# Patient Record
Sex: FEMALE | Race: OTHER
[De-identification: names, ages, dates, MRNs, and addresses within clinical notes are randomized per-mention and may not be internally consistent; named-entity substitution may affect disease eponyms.]

---

## 2017-08-08 ENCOUNTER — HOSPITAL ENCOUNTER (OUTPATIENT)
Dept: HOSPITAL 74 - JER | Age: 42
Setting detail: OBSERVATION
LOS: 1 days | Discharge: HOME | End: 2017-08-09
Attending: NURSE PRACTITIONER | Admitting: INTERNAL MEDICINE
Payer: COMMERCIAL

## 2017-08-08 VITALS — BODY MASS INDEX: 37.2 KG/M2

## 2017-08-08 DIAGNOSIS — F17.210: ICD-10-CM

## 2017-08-08 DIAGNOSIS — Z91.011: ICD-10-CM

## 2017-08-08 DIAGNOSIS — K61.1: Primary | ICD-10-CM

## 2017-08-08 DIAGNOSIS — F41.9: ICD-10-CM

## 2017-08-08 DIAGNOSIS — F32.9: ICD-10-CM

## 2017-08-08 DIAGNOSIS — Z91.040: ICD-10-CM

## 2017-08-08 LAB
ALBUMIN SERPL-MCNC: 3.9 G/DL (ref 3.4–5)
ALP SERPL-CCNC: 75 U/L (ref 45–117)
ALT SERPL-CCNC: 18 U/L (ref 12–78)
ANION GAP SERPL CALC-SCNC: 7 MMOL/L (ref 8–16)
APPEARANCE UR: (no result)
APTT BLD: 29.7 SECONDS (ref 26.9–34.4)
AST SERPL-CCNC: 9 U/L (ref 15–37)
BASOPHILS # BLD: 0.3 % (ref 0–2)
BILIRUB SERPL-MCNC: 0.5 MG/DL (ref 0.2–1)
BILIRUB UR STRIP.AUTO-MCNC: NEGATIVE MG/DL
CALCIUM SERPL-MCNC: 9 MG/DL (ref 8.5–10.1)
CK SERPL-CCNC: 73 IU/L (ref 26–192)
CO2 SERPL-SCNC: 24 MMOL/L (ref 21–32)
COLOR UR: YELLOW
CREAT SERPL-MCNC: 0.8 MG/DL (ref 0.55–1.02)
DEPRECATED RDW RBC AUTO: 15 % (ref 11.6–15.6)
EOSINOPHIL # BLD: 0.1 % (ref 0–4.5)
GLUCOSE SERPL-MCNC: 98 MG/DL (ref 74–106)
INR BLD: 1.2 (ref 0.82–1.09)
KETONES UR QL STRIP: NEGATIVE
LEUKOCYTE ESTERASE UR QL STRIP.AUTO: (no result)
MCH RBC QN AUTO: 24.1 PG (ref 25.7–33.7)
MCHC RBC AUTO-ENTMCNC: 32.4 G/DL (ref 32–36)
MCV RBC: 74.5 FL (ref 80–96)
MUCOUS THREADS URNS QL MICRO: (no result)
NEUTROPHILS # BLD: 67.6 % (ref 42.8–82.8)
NITRITE UR QL STRIP: NEGATIVE
PH BLDV: 7.43 [PH] (ref 7.32–7.42)
PH UR: 6 [PH] (ref 5–8)
PLATELET # BLD AUTO: 221 K/MM3 (ref 134–434)
PMV BLD: 8.7 FL (ref 7.5–11.1)
PROT SERPL-MCNC: 7.4 G/DL (ref 6.4–8.2)
PROT UR QL STRIP: NEGATIVE
PROT UR QL STRIP: NEGATIVE
PT PNL PPP: 13.3 SEC (ref 9.98–11.88)
RBC # BLD AUTO: 13 /HPF (ref 0–3)
RBC # UR STRIP: (no result) /UL
SAO2 % BLDV: 23.3 MEQ/L (ref 19–25)
TROPONIN I SERPL-MCNC: < 0.02 NG/ML (ref 0–0.05)
UROBILINOGEN UR STRIP-MCNC: NEGATIVE MG/DL (ref 0.2–1)
WBC # BLD AUTO: 11.1 K/MM3 (ref 4–10)
WBC # UR AUTO: 9 /HPF (ref 3–5)

## 2017-08-08 PROCEDURE — G0378 HOSPITAL OBSERVATION PER HR: HCPCS

## 2017-08-08 PROCEDURE — 3E0337Z INTRODUCTION OF ELECTROLYTIC AND WATER BALANCE SUBSTANCE INTO PERIPHERAL VEIN, PERCUTANEOUS APPROACH: ICD-10-PCS | Performed by: NURSE PRACTITIONER

## 2017-08-08 PROCEDURE — 3E033NZ INTRODUCTION OF ANALGESICS, HYPNOTICS, SEDATIVES INTO PERIPHERAL VEIN, PERCUTANEOUS APPROACH: ICD-10-PCS | Performed by: NURSE PRACTITIONER

## 2017-08-08 PROCEDURE — 3E03329 INTRODUCTION OF OTHER ANTI-INFECTIVE INTO PERIPHERAL VEIN, PERCUTANEOUS APPROACH: ICD-10-PCS | Performed by: NURSE PRACTITIONER

## 2017-08-08 NOTE — PDOC
History of Present Illness





- General


Chief Complaint: Bleeding from Anus


Stated Complaint: Rectal Bleed


Time Seen by Provider: 08/08/17 17:01


History Source: Patient


Exam Limitations: No Limitations





- History of Present Illness


Initial Comments: 





08/08/17 17:37


41 y/o female draining pus and blood from the rectum since yesterday. Patient 

states went to see Dr. Reddy for the above rerouted patient to the ER for 

concerning diagnosis of perirectal abscess. Patient states has had no fever but 

has had subjective chills. Patient denies history of immunosuppression 

including HIV and diabetes. Patient states has had similar symptoms 

approximately 2 years ago which resolved after having an I&D performed by Dr. Reddy.


Timing/Duration: getting worse


Severity: moderate


Associated Symptoms: reports: fever/chills





Past History





- Travel


Traveled outside of the country in the last 30 days: No


Close contact w/someone who was outside of country & ill: No





- Past Medical History


Allergies/Adverse Reactions: 


 Allergies











Allergy/AdvReac Type Severity Reaction Status Date / Time


 


latex Allergy   Verified 08/08/17 17:31


 


lactose AdvReac   Verified 08/08/17 17:32











Home Medications: 


Ambulatory Orders





Cyclobenzaprine HCl [Flexeril -] 10 mg PO DAILY 08/08/17 


Escitalopram Oxalate [Lexapro -] 10 mg PO DAILY 08/08/17 


Gabapentin [Neurontin] 100 mg PO TID 08/08/17 


Lamotrigine [Lamictal -] 25 mg PO DAILY 08/08/17 


Meloxicam [Mobic] 10 mg PO DAILY 08/08/17 


Amox-Tr/K Cl [Augmentin 875-125mg Tablet -] 1 tab PO BID@1000,2200 #14 tablet 08 /09/17 


Ibuprofen [Motrin -] 600 mg PO TID PRN #90 tablet 08/09/17 











- Psycho/Social/Smoking Cessation Hx


Suicidal Ideation: No


Smoking History: Current every day smoker


Number of Cigarettes Smoked Daily: 6


Information on smoking cessation initiated: No


Hx Alcohol Use: No


Drug/Substance Use Hx: No


Patient Lives Alone: No


Lives with/in: spouse/SO





**Review of Systems





- Review of Systems


Able to Perform ROS?: Yes


Constitutional: Yes: Chills


ABD/GI: No: Symptoms Reported


Musculoskeletal: No: Symptoms Reported


Integumentary: Yes: Erythema, Lumps (rectal)


Neurological: No: Symptoms reported


Endocrine: No: Symptoms Reported


Hematologic/Lymphatic: No: Symptoms Reported





*Physical Exam





- Vital Signs


 Last Vital Signs











Temp Pulse Resp BP Pulse Ox


 


 98.0 F   100 H  18   122/65   98 


 


 08/08/17 16:39  08/08/17 16:39  08/08/17 16:39  08/08/17 16:39  08/08/17 16:39














- Physical Exam


General Appearance: Yes: Nourished, Appropriately Dressed.  No: Apparent 

Distress


Rectal Exam: positive: other (noted erythematous edematous rectum draining blood

-tinged purulent fluid from anus. )


Integumentary: positive: Warm, Moist





ED Treatment Course





- LABORATORY


CBC & Chemistry Diagram: 


 08/09/17 07:30





 08/09/17 07:30





- RADIOLOGY


Radiology Studies Ordered: 














 Category Date Time Status


 


 ABDOMEN & PELVIS CT WITH CONTR [CT] Stat CT Scan  08/08/17 17:14 Ordered


 


 CHEST X-RAY PORTABLE* [RAD] Stat Radiology  08/08/17 17:12 Ordered














Medical Decision Making





- Medical Decision Making


08/08/17 17:28


Patient sent here from Dr. Reddy for evaluation of possible perirectal 

abscess. He is recommending medical evaluation including a CT. Patient has been 

ordered for septic workup including abdominal pelvic CT including perirectal 

contrast and IV contrast.





08/08/17 18:57


 Laboratory Tests











  08/08/17 08/08/17 08/08/17





  17:25 17:30 17:30


 


WBC   11.1 H 


 


Hgb   12.2 


 


Hct   37.7 


 


MCV   74.5 L 


 


MCH   24.1 L 


 


INR    1.20 H


 


Sodium   


 


Potassium   


 


Chloride   


 


Carbon Dioxide   


 


Anion Gap   


 


BUN   


 


Creatinine   


 


Random Glucose   


 


Lactic Acid  1.7  


 


Creatine Kinase   


 


Troponin I   


 


Urine HCG, Qual   














  08/08/17 08/08/17





  17:30 18:35


 


WBC  


 


Hgb  


 


Hct  


 


MCV  


 


MCH  


 


INR  


 


Sodium  137 


 


Potassium  3.9 


 


Chloride  106 


 


Carbon Dioxide  24 


 


Anion Gap  7 L 


 


BUN  18 


 


Creatinine  0.8 


 


Random Glucose  98 


 


Lactic Acid  


 


Creatine Kinase  73 


 


Troponin I  < 0.02 


 


Urine HCG, Qual   Pending














*DC/Admit/Observation/Transfer


Diagnosis at time of Disposition: 


 Perirectal abscess





- Discharge Dispostion


Disposition: HOME


Condition at time of disposition: Improved





- Prescriptions

## 2017-08-08 NOTE — HP
CHIEF COMPLAINT: Rectal Pain, Swelling, Discharge





PCP: Dr. Marya Connelly





HISTORY OF PRESENT ILLNESS:


This is a 41 y/o woman with a past medical history of Perirectal Abscess. Who 

presents to the ED from Dr. Reddy's office for rectal pain and swelling x 1 

week. Patient reports noting green pus mixed with serous discharge from her 

rectum today. Patient reports having chills with no subjective fever. Patient 

denies cough, SOB, CP, AP, N/V/D, constipation, dysuria. 





ER course was notable for:


(1) WBC 11.1


(2) Lactic Acid 1.7


(3) CT- negative perirectal abscess





Recent Travel: None





PAST MEDICAL HISTORY:


Perirectal Abscess


Depression


Anxiety


Herniated Discs (cervical, lumbar)





PAST SURGICAL HISTORY:


Hiatal Hernia Repair


R- ankle (hector/screws)


C- section 


TOP x2





Social History:


Smoking: Cigarettes 7/day


Alcohol:  Occasional


Drugs:    Marijuana use


Lives with S.O.








Family History:


Mother: HTN, AIDS, Hep C, Heroin Abuse


Father: HTN, CAD, HIV, Heroin Abuse


Brother: HTN





Allergies





latex Allergy (Verified 08/08/17 17:31)


 


lactose Adverse Reaction (Verified 08/08/17 17:32)


 








HOME MEDICATIONS:


 Home Medications











 Medication  Instructions  Recorded


 


Cyclobenzaprine HCl [Flexeril -] 10 mg PO DAILY 08/08/17


 


Escitalopram Oxalate [Lexapro -] 10 mg PO DAILY 08/08/17


 


Gabapentin [Neurontin] 100 mg PO TID 08/08/17


 


Lamotrigine [Lamictal -] 25 mg PO DAILY 08/08/17


 


Meloxicam [Mobic] 10 mg PO DAILY 08/08/17








REVIEW OF SYSTEMS


CONSTITUTIONAL: chills


Absent:  fever, diaphoresis, generalized weakness, malaise, loss of appetite, 

weight change


HEENT: 


Absent:  rhinorrhea, nasal congestion, throat pain, throat swelling, difficulty 

swallowing, mouth swelling, ear pain, eye pain, visual changes


CARDIOVASCULAR: 


Absent: chest pain, syncope, palpitations, irregular heart rate, lightheadedness

, peripheral edema


RESPIRATORY: 


Absent: cough, shortness of breath, dyspnea with exertion, orthopnea, wheezing, 

stridor, hemoptysis


GASTROINTESTINAL:


Absent: abdominal pain, abdominal distension, nausea, vomiting, diarrhea, 

constipation, melena, hematochezia


RECTUM: pain, swelling, erythema, pus


GENITOURINARY: 


Absent: dysuria, frequency, urgency, hesitancy, hematuria, flank pain, genital 

pain


MUSCULOSKELETAL: 


Absent: myalgia, arthralgia, joint swelling, back pain, neck pain


SKIN: 


Absent: rash, itching, pallor


HEMATOLOGIC/IMMUNOLOGIC: 


Absent: easy bleeding, easy bruising, lymphadenopathy, frequent infections


ENDOCRINE:


Absent: unexplained weight gain, unexplained weight loss, heat intolerance, 

cold intolerance


NEUROLOGIC: 


Absent: headache, focal weakness or paresthesias, dizziness, unsteady gait, 

seizure, mental status changes, bladder or bowel incontinence


PSYCHIATRIC: 


Absent: anxiety, depression, suicidal or homicidal ideation, hallucinations.








PHYSICAL EXAMINATION


 Vital Signs - 24 hr











  08/08/17 08/08/17





  16:39 19:49


 


Temperature 98.0 F 


 


Pulse Rate 100 H 


 


Pulse Rate [  76





Apical]  


 


Respiratory 18 17





Rate  


 


Blood Pressure 122/65 


 


Blood Pressure  121/64





[Left Arm]  


 


O2 Sat by Pulse 98 99





Oximetry (%)  











GENERAL: Awake, alert, and fully oriented, in no acute distress.


HEAD: Normal with no signs of trauma.


EYES: Pupils equal, round and reactive to light, extraocular movements intact, 

sclera anicteric, conjunctiva clear. No lid lag.


EARS, NOSE, THROAT: Ears normal, nares patent, oropharynx clear without 

exudates. Moist mucous membranes.


NECK: Normal range of motion, supple without lymphadenopathy, JVD, or masses.


LUNGS: Breath sounds equal, clear to auscultation bilaterally. No wheezes, and 

no crackles. No accessory muscle use.


HEART: Regular rate and rhythm, normal S1 and S2 without murmur, rub or gallop.


ABDOMEN: Soft, nontender, not distended, normoactive bowel sounds, no guarding, 

no rebound, no masses.  No hepatomegaly or  splenomegaly. 


MUSCULOSKELETAL: Normal range of motion at all joints. No bony deformities or 

tenderness. No CVA tenderness.


RECTUM: +erythema, tenderness to palpation, no external hemorrhoids


UPPER EXTREMITIES: 2+ pulses, warm, well-perfused. No cyanosis. No clubbing. No 

peripheral edema.


LOWER EXTREMITIES: 2+ pulses, warm, well-perfused. No calf tenderness. No 

peripheral edema. 


NEUROLOGICAL:  Cranial nerves II-XII intact. Normal speech. Gait not observed.


PSYCHIATRIC: Cooperative. Good eye contact. Appropriate mood and affect.


SKIN: Warm, dry, normal turgor, no rashes or lesions noted, normal capillary 

refill. 








 Laboratory Results - last 24 hr











  08/08/17 08/08/17 08/08/17





  17:25 17:30 17:30


 


WBC   11.1 H 


 


RBC   5.06 


 


Hgb   12.2 


 


Hct   37.7 


 


MCV   74.5 L 


 


MCH   24.1 L 


 


MCHC   32.4 


 


RDW   15.0 


 


Plt Count   221 


 


MPV   8.7 


 


Neutrophils %   67.6 


 


Lymphocytes %   23.6 


 


Monocytes %   8.4 


 


Eosinophils %   0.1 


 


Basophils %   0.3 


 


INR    1.20 H


 


PTT (Actin FS)    29.7


 


VBG pH   


 


POC VBG pCO2   


 


POC VBG pO2   


 


Mixed VBG HCO3   


 


Sodium   


 


Potassium   


 


Chloride   


 


Carbon Dioxide   


 


Anion Gap   


 


BUN   


 


Creatinine   


 


Creat Clearance w eGFR   


 


Random Glucose   


 


Lactic Acid  1.7  


 


Calcium   


 


Total Bilirubin   


 


AST   


 


ALT   


 


Alkaline Phosphatase   


 


Creatine Kinase   


 


Troponin I   


 


Total Protein   


 


Albumin   


 


Urine Color   


 


Urine Appearance   


 


Urine pH   


 


Urine Protein   


 


Urine Glucose (UA)   


 


Urine Ketones   


 


Urine Blood   


 


Urine Nitrite   


 


Urine Bilirubin   


 


Urine Urobilinogen   


 


Ur Leukocyte Esterase   


 


Urine RBC   


 


Urine WBC   


 


Ur Epithelial Cells   


 


Urine Mucus   


 


Urine HCG, Qual   


 


Blood Type   


 


Antibody Screen   














  08/08/17 08/08/17 08/08/17





  17:30 17:30 17:50


 


WBC   


 


RBC   


 


Hgb   


 


Hct   


 


MCV   


 


MCH   


 


MCHC   


 


RDW   


 


Plt Count   


 


MPV   


 


Neutrophils %   


 


Lymphocytes %   


 


Monocytes %   


 


Eosinophils %   


 


Basophils %   


 


INR   


 


PTT (Actin FS)   


 


VBG pH    7.43 H


 


POC VBG pCO2    35.5 L


 


POC VBG pO2    59.7 H


 


Mixed VBG HCO3    23.3


 


Sodium  137  


 


Potassium  3.9  


 


Chloride  106  


 


Carbon Dioxide  24  


 


Anion Gap  7 L  


 


BUN  18  


 


Creatinine  0.8  


 


Creat Clearance w eGFR  > 60  


 


Random Glucose  98  


 


Lactic Acid   


 


Calcium  9.0  


 


Total Bilirubin  0.5  


 


AST  9 L  


 


ALT  18  


 


Alkaline Phosphatase  75  


 


Creatine Kinase  73  


 


Troponin I  < 0.02  


 


Total Protein  7.4  


 


Albumin  3.9  


 


Urine Color   


 


Urine Appearance   


 


Urine pH   


 


Urine Protein   


 


Urine Glucose (UA)   


 


Urine Ketones   


 


Urine Blood   


 


Urine Nitrite   


 


Urine Bilirubin   


 


Urine Urobilinogen   


 


Ur Leukocyte Esterase   


 


Urine RBC   


 


Urine WBC   


 


Ur Epithelial Cells   


 


Urine Mucus   


 


Urine HCG, Qual   


 


Blood Type   A POSITIVE 


 


Antibody Screen   Negative 














  08/08/17 08/08/17





  18:35 18:35


 


WBC  


 


RBC  


 


Hgb  


 


Hct  


 


MCV  


 


MCH  


 


MCHC  


 


RDW  


 


Plt Count  


 


MPV  


 


Neutrophils %  


 


Lymphocytes %  


 


Monocytes %  


 


Eosinophils %  


 


Basophils %  


 


INR  


 


PTT (Actin FS)  


 


VBG pH  


 


POC VBG pCO2  


 


POC VBG pO2  


 


Mixed VBG HCO3  


 


Sodium  


 


Potassium  


 


Chloride  


 


Carbon Dioxide  


 


Anion Gap  


 


BUN  


 


Creatinine  


 


Creat Clearance w eGFR  


 


Random Glucose  


 


Lactic Acid  


 


Calcium  


 


Total Bilirubin  


 


AST  


 


ALT  


 


Alkaline Phosphatase  


 


Creatine Kinase  


 


Troponin I  


 


Total Protein  


 


Albumin  


 


Urine Color  Yellow 


 


Urine Appearance  Slcloudy 


 


Urine pH  6.0 


 


Urine Protein  Negative 


 


Urine Glucose (UA)  Negative 


 


Urine Ketones  Negative 


 


Urine Blood  2+ H 


 


Urine Nitrite  Negative 


 


Urine Bilirubin  Negative 


 


Urine Urobilinogen  Negative 


 


Ur Leukocyte Esterase  2+ H 


 


Urine RBC  13 


 


Urine WBC  9 


 


Ur Epithelial Cells  Rare 


 


Urine Mucus  Many 


 


Urine HCG, Qual   Negative


 


Blood Type  


 


Antibody Screen  











ASSESSMENT/PLAN:


This is a 41 y/o woman with a PMHx of: Depression, Anxiety, Perirectal Abscess, 

Herniated disc (cervical, lumbar). Placed on Observation for Perirectal Abscess 

for further evaluation of their emergent condition.





Problem List





- Problem


(1) Perirectal abscess


Assessment/Plan: 


- Mildly elevated WBC


- Lactic Acid- wnl


- Wound Culture-pending


- CTAP report- no evidence of perirectal abscess


- Unasyn given in ED, will continue


- Appreciate Surgical Consult


- CBC in am


- Monitor vitals


- Morphine prn


Code(s): K61.1 - RECTAL ABSCESS





(2) Depression


Assessment/Plan: 


- Continue home med


Code(s): F32.9 - MAJOR DEPRESSIVE DISORDER, SINGLE EPISODE, UNSPECIFIED   





(3) Anxiety


Assessment/Plan: 


- Continue home med


Code(s): F41.9 - ANXIETY DISORDER, UNSPECIFIED





(4) DVT prophylaxis


Assessment/Plan: 


- OOB


- SCDs


- Consider AC if LOS > 48 hrs


Code(s): VBI2479 - 








Visit type





- Emergency Visit


Emergency Visit: Yes


ED Registration Date: 08/08/17


Care time: The patient presented to the Emergency Department on the above date 

and was hospitalized for further evaluation of their emergent condition.





- New Patient


This patient is new to me today: Yes


Date on this admission: 08/08/17





- Critical Care


Critical Care patient: No

## 2017-08-08 NOTE — PDOC
*Physical Exam





- Vital Signs


 Last Vital Signs











Temp Pulse Resp BP Pulse Ox


 


 98.0 F   76   17   121/64   99 


 


 08/08/17 16:39  08/08/17 19:49  08/08/17 19:49  08/08/17 19:49  08/08/17 19:49














- Physical Exam


Comments: 


08/08/17 20:08


Sign-out received from outgoing ER provider Yris.


Pt interviewed and examined.


Ancillary studies reviewed.





Awaiting rectal CT. 





08/08/17 22:23


Rectal CT negative for perirectal abscess. 





Discussed case with covering GI MD Jeffrey. Given hx of perirectal abscess and 

significant pain, will admit for surgical consult tomorrow.  





Discussed case with covering surgeon MD Burton.  Dr. Burton will see patient 

tomorrow.  





Patient's PCP is Dr. Connie Malhotra, will admit to hospitalist.  Discussed case 

with hospitalist attending MD Almeida, who accepts patient to observation service 

for surgical consult tomorrow.





ED Treatment Course





- LABORATORY


CBC & Chemistry Diagram: 


 08/08/17 17:30





 08/08/17 17:30





- ADDITIONAL ORDERS


Additional order review: 


 Laboratory  Results











  08/08/17 08/08/17 08/08/17





  18:35 18:35 17:50


 


INR   


 


PTT (Actin FS)   


 


VBG pH    7.43 H


 


POC VBG pCO2    35.5 L


 


POC VBG pO2    59.7 H


 


Mixed VBG HCO3    23.3


 


Sodium   


 


Potassium   


 


Chloride   


 


Carbon Dioxide   


 


Anion Gap   


 


BUN   


 


Creatinine   


 


Creat Clearance w eGFR   


 


Random Glucose   


 


Lactic Acid   


 


Calcium   


 


Total Bilirubin   


 


AST   


 


ALT   


 


Alkaline Phosphatase   


 


Creatine Kinase   


 


Troponin I   


 


Total Protein   


 


Albumin   


 


Urine Color   Yellow 


 


Urine Appearance   Slcloudy 


 


Urine pH   6.0 


 


Urine Protein   Negative 


 


Urine Glucose (UA)   Negative 


 


Urine Ketones   Negative 


 


Urine Blood   2+ H 


 


Urine Nitrite   Negative 


 


Urine Bilirubin   Negative 


 


Urine Urobilinogen   Negative 


 


Ur Leukocyte Esterase   2+ H 


 


Urine RBC   13 


 


Urine WBC   9 


 


Ur Epithelial Cells   Rare 


 


Urine Mucus   Many 


 


Urine HCG, Qual  Negative  


 


Blood Type   


 


Antibody Screen   














  08/08/17 08/08/17 08/08/17





  17:30 17:30 17:30


 


INR    1.20 H


 


PTT (Actin FS)    29.7


 


VBG pH   


 


POC VBG pCO2   


 


POC VBG pO2   


 


Mixed VBG HCO3   


 


Sodium   137 


 


Potassium   3.9 


 


Chloride   106 


 


Carbon Dioxide   24 


 


Anion Gap   7 L 


 


BUN   18 


 


Creatinine   0.8 


 


Creat Clearance w eGFR   > 60 


 


Random Glucose   98 


 


Lactic Acid   


 


Calcium   9.0 


 


Total Bilirubin   0.5 


 


AST   9 L 


 


ALT   18 


 


Alkaline Phosphatase   75 


 


Creatine Kinase   73 


 


Troponin I   < 0.02 


 


Total Protein   7.4 


 


Albumin   3.9 


 


Urine Color   


 


Urine Appearance   


 


Urine pH   


 


Urine Protein   


 


Urine Glucose (UA)   


 


Urine Ketones   


 


Urine Blood   


 


Urine Nitrite   


 


Urine Bilirubin   


 


Urine Urobilinogen   


 


Ur Leukocyte Esterase   


 


Urine RBC   


 


Urine WBC   


 


Ur Epithelial Cells   


 


Urine Mucus   


 


Urine HCG, Qual   


 


Blood Type  A POSITIVE  


 


Antibody Screen  Negative  














  08/08/17





  17:25


 


INR 


 


PTT (Actin FS) 


 


VBG pH 


 


POC VBG pCO2 


 


POC VBG pO2 


 


Mixed VBG HCO3 


 


Sodium 


 


Potassium 


 


Chloride 


 


Carbon Dioxide 


 


Anion Gap 


 


BUN 


 


Creatinine 


 


Creat Clearance w eGFR 


 


Random Glucose 


 


Lactic Acid  1.7


 


Calcium 


 


Total Bilirubin 


 


AST 


 


ALT 


 


Alkaline Phosphatase 


 


Creatine Kinase 


 


Troponin I 


 


Total Protein 


 


Albumin 


 


Urine Color 


 


Urine Appearance 


 


Urine pH 


 


Urine Protein 


 


Urine Glucose (UA) 


 


Urine Ketones 


 


Urine Blood 


 


Urine Nitrite 


 


Urine Bilirubin 


 


Urine Urobilinogen 


 


Ur Leukocyte Esterase 


 


Urine RBC 


 


Urine WBC 


 


Ur Epithelial Cells 


 


Urine Mucus 


 


Urine HCG, Qual 


 


Blood Type 


 


Antibody Screen 








 











  08/08/17





  17:30


 


RBC  5.06


 


MCV  74.5 L


 


MCHC  32.4


 


RDW  15.0


 


MPV  8.7


 


Neutrophils %  67.6


 


Lymphocytes %  23.6


 


Monocytes %  8.4


 


Eosinophils %  0.1


 


Basophils %  0.3














- Medications


Given in the ED: 


ED Medications














Discontinued Medications














Generic Name Dose Route Start Last Admin





  Trade Name Freq  PRN Reason Stop Dose Admin


 


Sodium Chloride  1,000 mls @ 1,000 mls/hr 08/08/17 17:12 08/08/17 18:05





  Normal Saline -  IV 08/08/17 18:11  1,000 mls/hr





  ASDIR STA   Administration


 


Ampicillin Sodium/Sulbactam  100 mls @ 200 mls/hr 08/08/17 17:56 08/08/17 18:30





  Sodium 1.5 gm/ Sodium Chloride  IVPB 08/08/17 18:25  200 mls/hr





  ONCE ONE   Administration














*DC/Admit/Observation/Transfer


Diagnosis at time of Disposition: 


 Perirectal abscess





- Discharge Dispostion


Admit: Yes

## 2017-08-09 VITALS — SYSTOLIC BLOOD PRESSURE: 101 MMHG | HEART RATE: 58 BPM | DIASTOLIC BLOOD PRESSURE: 51 MMHG | TEMPERATURE: 98.2 F

## 2017-08-09 LAB
ANION GAP SERPL CALC-SCNC: 9 MMOL/L (ref 8–16)
BASOPHILS # BLD: 0.3 % (ref 0–2)
CALCIUM SERPL-MCNC: 8.3 MG/DL (ref 8.5–10.1)
CO2 SERPL-SCNC: 26 MMOL/L (ref 21–32)
CREAT SERPL-MCNC: 0.6 MG/DL (ref 0.55–1.02)
DEPRECATED RDW RBC AUTO: 15.3 % (ref 11.6–15.6)
EOSINOPHIL # BLD: 0.2 % (ref 0–4.5)
GLUCOSE SERPL-MCNC: 101 MG/DL (ref 74–106)
MCH RBC QN AUTO: 23.7 PG (ref 25.7–33.7)
MCHC RBC AUTO-ENTMCNC: 31.5 G/DL (ref 32–36)
MCV RBC: 75.1 FL (ref 80–96)
NEUTROPHILS # BLD: 50.5 % (ref 42.8–82.8)
PLATELET # BLD AUTO: 186 K/MM3 (ref 134–434)
PMV BLD: 8.4 FL (ref 7.5–11.1)
SP GR UR: 1.02 (ref 1–1.02)
WBC # BLD AUTO: 10 K/MM3 (ref 4–10)

## 2017-08-09 RX ADMIN — Medication SCH: at 14:15

## 2017-08-09 RX ADMIN — AMOXICILLIN AND CLAVULANATE POTASSIUM SCH: 875; 125 TABLET, FILM COATED ORAL at 10:21

## 2017-08-09 RX ADMIN — AMOXICILLIN AND CLAVULANATE POTASSIUM SCH TAB: 875; 125 TABLET, FILM COATED ORAL at 12:44

## 2017-08-09 RX ADMIN — Medication SCH APPLIC: at 12:57

## 2017-08-09 NOTE — DS
Physical Examination


Vital Signs: 


 Vital Signs











Temperature  98.2 F   08/09/17 08:28


 


Pulse Rate  58 L  08/09/17 08:28


 


Respiratory Rate  18   08/09/17 08:28


 


Blood Pressure  101/51   08/09/17 08:28


 


O2 Sat by Pulse Oximetry (%)  94 L  08/09/17 00:33











Labs: 


 CBC, BMP





 08/09/17 07:30 





 08/09/17 07:30 











Discharge Summary


Reason For Visit: PERIRECTAL ABSCESS


Current Active Problems





Anxiety (Acute) 


DVT prophylaxis (Acute) 


Depression (Acute) 


Perirectal abscess (Acute) 








Condition: Improved





- Instructions


Diet, Activity, Other Instructions: 


Please return to the ED with new, persistent, or worsening symptoms. Please 

follow-up with providers as indicated. 





Please follow-up with your primary care provider for outpatient HIV testing and 

to have your HgbA1c checked. 





Continue Augmentin 875mg by mouth twice a day for 7 days. 





Warm Sitz bath three times a day with epsom salts. 


Referrals: 


Lobo Reddy MD [Staff Physician] -  (Please follow-up with Dr. Reddy within 1 

week for further management of your rectal abscess)


Stephen Burton MD [Staff Physician] - 3 Weeks


Disposition: HOME





- Home Medications


Comprehensive Discharge Medication List: 


Ambulatory Orders





Cyclobenzaprine HCl [Flexeril -] 10 mg PO DAILY 08/08/17 


Escitalopram Oxalate [Lexapro -] 10 mg PO DAILY 08/08/17 


Gabapentin [Neurontin] 100 mg PO TID 08/08/17 


Lamotrigine [Lamictal -] 25 mg PO DAILY 08/08/17 


Meloxicam [Mobic] 10 mg PO DAILY 08/08/17 


Amox-Tr/K Cl [Augmentin 875-125mg Tablet -] 1 tab PO BID@1000,2200 #14 tablet 08 /09/17

## 2017-08-09 NOTE — EKG
Test Reason : 

Blood Pressure : ***/*** mmHG

Vent. Rate : 078 BPM     Atrial Rate : 078 BPM

   P-R Int : 156 ms          QRS Dur : 086 ms

    QT Int : 360 ms       P-R-T Axes : 049 040 018 degrees

   QTc Int : 410 ms

 

NORMAL SINUS RHYTHM

POSSIBLE LEFT ATRIAL ENLARGEMENT

NONSPECIFIC ST ABNORMALITY

ABNORMAL ECG

NO PREVIOUS ECGS AVAILABLE

Confirmed by FRANCIA GAMINO MD ([id number]) on 8/9/2017 2:44:55 PM

 

Referred By:             Confirmed By:FRANCIA GAMINO MD

## 2017-08-09 NOTE — PN
Progress Note (short form)





- Note


Progress Note: 


surgery





pt seen and examined.  full consult dictated.  42f with history of perirectal 

abscesses, last colonoscopy 1 year ago, 15 lb weight loss in last 3 months with 

new diet plan, presents with kelvin-rectal abscess that spontaneously drained.  

CT negative for collection.  wbc 10, no fever, pt feels well.





on exam there is a spontaneous draining abscess at 6:00 position.  No remaining 

fluctuance.  MAE deferred for pain.





Plan- surgically stable for d/c.  recommend 1 week augmentin 875 bid.  warm 

sitz bath tid with epsom salt.  f/u in 3 weeks with me or Dr. Reddy to 

evaluate for fistula formation.





[phone]

## 2017-08-09 NOTE — PN
Progress Note (short form)





- Note


Progress Note: 


Subjective: The patient was seen and examined at the bedside, she states minor 

perirectal pain. She reports having relief with Morphine. 


Awaiting surgical consult


Changed abx to Augmentin, awaiting wound culture


 Current Medications











Generic Name Dose Route Start Last Admin





  Trade Name Freq  PRN Reason Stop Dose Admin


 


Amoxicillin/Clavulanate Potassium  1 tab 08/09/17 10:00  





  Augmentin - 875mg Tablet  PO   





  BID@1000,2200 LORENA   


 


Dextrose/Sodium Chloride  1,000 mls @ 83 mls/hr 08/09/17 01:45 08/09/17 02:48





  D5-1/2ns -  IV   83 mls/hr





  ASDIR LORENA   Administration


 


Morphine Sulfate  4 mg 08/09/17 01:36 08/09/17 06:46





  Morphine Injection -  IVPUSH   4 mg





  Q6H PRN   Administration





  PAIN   








Objective: 


 Vital Signs











 Period  Temp  Pulse  Resp  BP Sys/Simon  Pulse Ox


 


 Last 24 Hr  98.0 F-98.7 F    15-20  101-122/51-68  94-99








Physical Exam: 


General: NAD, A&Ox3


Lungs: CTA bilaterally


Heart: RRR, S1S2


: Posterior rectal draining abscess, erythema noted, however no surrounding 

erythema. Tenderness present. 


Neuro: CN 2-12 intact





 CBCD











WBC  10.0 K/mm3 (4.0-10.0)   08/09/17  07:30    


 


RBC  4.69 M/mm3 (3.60-5.2)   08/09/17  07:30    


 


Hgb  11.1 GM/dL (10.7-15.3)   08/09/17  07:30    


 


Hct  35.2 % (32.4-45.2)   08/09/17  07:30    


 


MCV  75.1 fl (80-96)  L  08/09/17  07:30    


 


MCHC  31.5 g/dl (32.0-36.0)  L  08/09/17  07:30    


 


RDW  15.3 % (11.6-15.6)   08/09/17  07:30    


 


Plt Count  186 K/MM3 (134-434)   08/09/17  07:30    


 


MPV  8.4 fl (7.5-11.1)   08/09/17  07:30    








 CMP











Sodium  138 mmol/L (136-145)   08/09/17  07:30    


 


Potassium  3.9 mmol/L (3.5-5.1)   08/09/17  07:30    


 


Chloride  103 mmol/L ()   08/09/17  07:30    


 


Carbon Dioxide  26 mmol/L (21-32)   08/09/17  07:30    


 


Anion Gap  9  (8-16)   08/09/17  07:30    


 


BUN  19 mg/dL (7-18)  H  08/09/17  07:30    


 


Creatinine  0.6 mg/dL (0.55-1.02)  D 08/09/17  07:30    


 


Creat Clearance w eGFR  > 60  (>60)   08/08/17  17:30    


 


Random Glucose  101 mg/dL ()   08/09/17  07:30    


 


Calcium  8.3 mg/dL (8.5-10.1)  L  08/09/17  07:30    


 


Total Bilirubin  0.5 mg/dL (0.2-1.0)   08/08/17  17:30    


 


AST  9 U/L (15-37)  L  08/08/17  17:30    


 


ALT  18 U/L (12-78)   08/08/17  17:30    


 


Alkaline Phosphatase  75 U/L ()   08/08/17  17:30    


 


Total Protein  7.4 g/dl (6.4-8.2)   08/08/17  17:30    


 


Albumin  3.9 g/dl (3.4-5.0)   08/08/17  17:30    








 CARDIAC ENZYMES











Creatine Kinase  73 IU/L ()   08/08/17  17:30    


 


Troponin I  < 0.02 ng/ml (0.00-0.05)   08/08/17  17:30    











Assessment: This is a 42 year old female with PMHx of perirectal abscess, 

depression, anxiety, who presented to the ED with drainage, pain in her rectal 

area. 





Plan:


1) ID: Perirectal abscess


- Open and draining


- No evidence of surrounding cellulits


- Change abx to po Augmentin


- CTAP with no evidence of perirectal abscess


- F/u wound culture


- Pain management


- Sitz bath


- F/u surgery consult





2) Psych: Depression/anxiety


- Continue Lexapro


- Continue Lamictal





3) F/E/N:


- Monitor electrolytes


- NPO until surgical consult





4) Prophylaxis: 


- Hold all chemical DVT prophylaxis until evaluated by surgery


- SCDs bilaterally





5) Dispo:


- Once evaluated by surgery





CODE STATUS: FULL CODE





Visit type





- Emergency Visit


Emergency Visit: Yes


ED Registration Date: 08/08/17


Care time: The patient presented to the Emergency Department on the above date 

and was hospitalized for further evaluation of their emergent condition.





- New Patient


This patient is new to me today: Yes


Date on this admission: 08/09/17





- Critical Care


Critical Care patient: No

## 2017-08-09 NOTE — CONS
DATE OF CONSULTATION:  2017

 

REASON FOR CONSULTATION:  Perirectal abscess.  

 

This is an emergency room consultation requested by the emergency room physician. 

The patient was subsequently admitted to the floor and is being seen and examined as

an inpatient. 

 

REASON FOR CONSULTATION:  Perirectal abscess.  

 

BRIEF HISTORY:  This is a 42 -year-old female who has a history of perirectal

abscesses.  She had a colonoscopy 1 year ago at which time Dr. Reddy lanced one. 

Since that time she has had a couple more that spontaneously drained on their own and

she developed 1 yesterday and because of this came into the [facility] Emergency

Room.  There it also spontaneously ruptured and she was admitted to be evaluated by a

surgeon.  Overnight her pain has resolved.  She has no fever and her white blood cell

count is normal.  She had a CAT scan of her abdomen and pelvis done in the emergency

room which showed no evidence of a perirectal abscess.  The patient was placed on

Augmentin antibiotic.  She denies nausea, denies vomiting, denies blood in her stool.

 She admits to recent 15 pound weight loss but states she has been dieting with a new

green coffee diet plan and all of her weight loss is intentional.  

 

PAST MEDICAL HISTORY:  As per HPI.  In addition, she has depression, anxiety, and a

herniated disk.  She has also been told she has a hiatal hernia.  

 

PAST SURGICAL HISTORY:  Includes right ankle surgery,  section, and 2

abortions.  

 

SOCIAL HISTORY:  Significant for tobacco, alcohol and marijuana.  She has been

encouraged to quit.  

 

ALLERGIES:  LATEX AND LACTULOSE. 

 

FAMILY HISTORY:  Significant for a mother with cervical cancer secondary to HPV

virus.  She also admits HPV virus.  

 

REVIEW OF SYSTEMS: 

General:  Denies fatigue or malaise.  

Cardiac:  Denies chest pain or palpitations.  

Respiratory:  Denies shortness of breath or wheeze.  

Gastrointestinal:  As in HPI.  

Genitourinary:  Denies dysuria.  

Musculoskeletal:  Denies joint pain.  Admits to back pain.  

Psychiatric:  Admits to some depression.  

 

MEDICATIONS:  Her home medications have been reviewed.  They include Flexeril,

Lamictal, Lexapro, Mobic and gabapentin.  

 

PHYSICAL EXAMINATION: 

General:  This is an obese 42 -year-old female in no distress.  She is currently

afebrile and she has been since admission.  

HEENT:  Her head is normocephalic, her sclerae anicteric.    

Neck:  Supple.  

Chest:  Clear.  

Abdomen:  Soft.  

Extremities:  No edema.  

Visual Rectal Exam:  She is noted to have a draining perirectal abscess at the 6

o'clock position.  There is no fluctuance noted on the outside and no erythema. 

Digital rectal exam is deferred as the patient states that she does not want that

because of discomfort.  

 

LABORATORY DATA:  On review of her laboratory her white blood cell count is 10.0

which is down from 11.1.  She does not have a shift.  Her chemistries are

unremarkable with a normal lactic acid.  Her imaging is as in HPI.  

 

ASSESSMENT:  This is a 42 -year-old female with history of perirectal abscess and

colonoscopy 1 year ago.  At this point she has a spontaneously draining perirectal

abscess and does not require further incision and drainage.  At this point there is

no surgical contraindications at discharge.  I agree with Augmentin antibiotic and

would treat for 1 week.  Would do warm Sitz baths with Epson salt 3 times a day to

help continue the drainage.  It is okay for patient to start a diet and likely be

discharged today.  She should follow with myself or Dr. Reddy in approximately 3

weeks' time to evaluate for development of an anal fistula.  A repeat colonoscopy,

timing per Dr. Reddy.  

 

 

DO АЛЕКСАНДР MCKEON/[id number]

DD: 2017 11:25

DT: 2017 12:41

Job #:  [id number]

## 2017-08-22 ENCOUNTER — HOSPITAL ENCOUNTER (INPATIENT)
Dept: HOSPITAL 74 - YASAS | Age: 42
LOS: 14 days | Discharge: HOME | DRG: 772 | End: 2017-09-05
Attending: PSYCHIATRY & NEUROLOGY | Admitting: PSYCHIATRY & NEUROLOGY
Payer: COMMERCIAL

## 2017-08-22 VITALS — BODY MASS INDEX: 35.7 KG/M2

## 2017-08-22 DIAGNOSIS — E73.9: ICD-10-CM

## 2017-08-22 DIAGNOSIS — Z86.69: ICD-10-CM

## 2017-08-22 DIAGNOSIS — F12.20: Primary | ICD-10-CM

## 2017-08-22 DIAGNOSIS — F17.210: ICD-10-CM

## 2017-08-22 DIAGNOSIS — M54.5: ICD-10-CM

## 2017-08-22 DIAGNOSIS — M79.621: ICD-10-CM

## 2017-08-22 DIAGNOSIS — F31.81: ICD-10-CM

## 2017-08-22 DIAGNOSIS — Z91.040: ICD-10-CM
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
## 2017-08-22 DIAGNOSIS — K21.9: ICD-10-CM

## 2017-08-22 DIAGNOSIS — F19.24: ICD-10-CM

## 2017-08-22 DIAGNOSIS — Z87.81: ICD-10-CM

## 2017-08-22 DIAGNOSIS — Z87.19: ICD-10-CM
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
## 2017-08-22 PROCEDURE — HZ42ZZZ GROUP COUNSELING FOR SUBSTANCE ABUSE TREATMENT, COGNITIVE-BEHAVIORAL: ICD-10-PCS | Performed by: PSYCHIATRY & NEUROLOGY

## 2017-08-22 RX ADMIN — AMOXICILLIN AND CLAVULANATE POTASSIUM SCH TAB: 875; 125 TABLET, FILM COATED ORAL at 23:13

## 2017-08-22 RX ADMIN — Medication SCH MG: at 23:13

## 2017-08-22 RX ADMIN — GABAPENTIN SCH MG: 100 CAPSULE ORAL at 23:13

## 2017-08-22 NOTE — HP
Admission ROS [facility]


Chief Complaint: 


i am here for rehab from marijuana


Allergies/Adverse Reactions: 


 Allergies











Allergy/AdvReac Type Severity Reaction Status Date / Time


 


latex Allergy  Itching Verified 17 22:38


 


No Known Drug Allergies Allergy   Verified 17 09:15


 


lactose AdvReac Severe bloating Verified 17 22:38


 


NKDA Allergy   Uncoded 17 22:38











History of Present Illness: 


this 42 years old female with marijuana dependence,seeking rehab,last treatment 

in 


history of hiatus hernia


low back pain,neck pain,anemia


febrile convulsion last age of 5 years old


anxiety.depression


longest period of sobriety 2 and half years


history of perianal abscess

















- Ebola screening


Have you traveled outside of the country in the last 21 days: No


Have you had contact with anyone from an Ebola affected area: No


Have you been sick,other than usual withdrawal symptoms: No





- Review of Systems


Constitutional: Changes in sleep


EENT: reports: No Symptoms Reported


Respiratory: reports: No Symptoms reported


Cardiac: reports: No Symptoms Reported


GI: reports: No Symptoms Reported, Other


: reports: No Symptoms Reported


Musculoskeletal: reports: No Symptoms Reported


Integumentary: reports: No Symptoms Reported


Neuro: reports: No Symptoms reported


Endocrine: reports: No Symptoms Reported


Hematology: reports: Anemia


Psychiatric: reports: No Sypmtoms Reported, Judgement Intact, Mood/Affect 

Appropiate, Orientated x3, Anxious, Depressed





Patient History





- Patient Medical History


Hx Anemia: Yes (on iron )


Hx Asthma: No


Hx Chronic Obstructive Pulmonary Disease (COPD): No


Hx Cancer: No


Hx Cardiac Disorders: No


Hx Congestive Heart Failure: No


Hx Hypertension: No


Hx Hypercholesterolemia: No


Hx Pacemaker: No


HX Cerebrovascular Accident: No


Hx Seizures: Yes (febrile convulsion last age of 5 years)


Hx Dementia: No


Hx Diabetes: No


Hx Gastrointestinal Disorders: Yes (gerd)


Hx Liver Disease: No


Hx Genitourinary Disorders: No


Hx Sexually Transmitted Disorders: No


Hx Renal Disease (ESRD): No


Hx Thyroid Disease: No


Hx Human Immunodeficiency Virus (HIV): No (last )


Hx Hepatitis C: No


Hx Depression: Yes (anxieety)


Hx Suicide Attempt: No


Hx Bipolar Disorder: No


Hx Schizophrenia: No


Other Medical History: no sucidal,no homicidal,s/p i and d of perianal abscess





- Patient Surgical History


Past Surgical History: Yes


Hx  Section: Yes ()


Hx Orthopedic Surgery: Yes (right ankle )





- PPD History


Previous Implant?: Yes


Documented Results: Negative w/o proof


Implanted On Prior R Admission?: Yes


PPD to be Administered?: Yes





- Reproductive History


Patient is a Female of Child Bearing Age (11 -55 yrs old): Yes


Last Menstrual Period: 08/10/17


Patient Pregnant: No





- Smoking Cessation


Smoking history: Current every day smoker


Have you smoked in the past 12 months: Yes


Aproximately how many cigarettes per day: 10


Hx Chewing Tobacco Use: No


Initiated information on smoking cessation: Yes


'Breaking Loose' booklet given: 17





- Substance & Tx. History


Hx Alcohol Use: No


Hx Substance Use: Yes


Substance Use Type: Marijuana


Hx Substance Use Treatment: Yes (last treatment 2011 [place])





- Substances Abused


  ** Marijuana/Hashish


Route: Smoking


Frequency: Daily


Amount used: 10$


Age of first use: 16


Date of Last Use: 17





Family Disease History





- Family Disease History


Family Disease History: Other: Father (heroin,crack,), Mother (heroin,

on suboxone), Brother (dsa)





Admission Physical Exam [facility]





- Vital Signs


Vital Signs: 


 Vital Signs - 24 hr











  17





  13:08


 


Temperature 99.0 F


 


Pulse Rate 90


 


Respiratory 18





Rate 


 


Blood Pressure 150/97














- Physical


General Appearance: Yes: Within Normal Limits


HEENTM: Yes: Hearing grossly Normal, Normal ENT Inspection, Pharynx Normal


Respiratory: Yes: Lungs Clear, Normal Breath Sounds, No Respiratory Distress


Neck: Yes: Within Normal Limits


Breast: Yes: Breast Exam Deferred


Cardiology: Yes: Within Normal Limits, Regular Rhythm, Regular Rate, S1, S2


Abdominal: Yes: Within Normal Limits, Normal Bowel Sounds, Non Tender, Soft


Genitourinary: Yes: Within Normal Limits


Back: Yes: Other (pain in the right ankle)


Musculoskeletal: Yes: Back pain


Extremities: Yes: Within Normal Limits, Normal Range of Motion


Neurological: Yes: CNs II-XII NML intact, Fully Oriented, Alert, Motor Strength 

5/5


Integumentary: Yes: Dry


Lymphatic: Yes: Within Normal Limits





- Diagnostic


(1) Perirectal abscess


Current Visit: Yes   Status: Resolved





(2) Cannabis dependence


Current Visit: Yes   Status: Chronic





(3) Febrile convulsion


Current Visit: Yes   Status: Acute





(4) Lactose intolerance


Current Visit: Yes   Status: Chronic





(5) GERD (gastroesophageal reflux disease)


Current Visit: Yes   Status: Chronic   





(6) Low back pain


Current Visit: Yes   Status: Chronic   





(7) Neck pain


Current Visit: Yes   Status: Acute





(8) History of fracture of right ankle


Current Visit: Yes   Status: Resolved








Cleared for Admission [facility]





- Detox or Rehab


Claeared for Rehab Admission: Yes





[facility] Breath Alcohol Content


Breath Alcohol Content: 0





Urine Pregancy Test





- Result


Urine Pregnancy Test Results: Negative- NO Line Present





Urine Drug Screen





- Results


Drug Screen Negative: No


Urine Drug Screen Results: THC-Marijuana

## 2017-08-23 LAB
ALBUMIN SERPL-MCNC: 3.3 G/DL (ref 3.4–5)
ALP SERPL-CCNC: 61 U/L (ref 45–117)
ALT SERPL-CCNC: 18 U/L (ref 12–78)
ANION GAP SERPL CALC-SCNC: 6 MMOL/L (ref 8–16)
AST SERPL-CCNC: 11 U/L (ref 15–37)
BILIRUB SERPL-MCNC: 0.4 MG/DL (ref 0.2–1)
CALCIUM SERPL-MCNC: 8.5 MG/DL (ref 8.5–10.1)
CO2 SERPL-SCNC: 27 MMOL/L (ref 21–32)
CREAT SERPL-MCNC: 0.7 MG/DL (ref 0.55–1.02)
DEPRECATED RDW RBC AUTO: 15.2 % (ref 11.6–15.6)
GLUCOSE SERPL-MCNC: 86 MG/DL (ref 74–106)
MCH RBC QN AUTO: 24 PG (ref 25.7–33.7)
MCHC RBC AUTO-ENTMCNC: 31.6 G/DL (ref 32–36)
MCV RBC: 75.9 FL (ref 80–96)
PLATELET # BLD AUTO: 158 K/MM3 (ref 134–434)
PMV BLD: 8.8 FL (ref 7.5–11.1)
PROT SERPL-MCNC: 6.1 G/DL (ref 6.4–8.2)
WBC # BLD AUTO: 8.9 K/MM3 (ref 4–10)

## 2017-08-23 RX ADMIN — ESCITALOPRAM SCH MG: 20 TABLET, FILM COATED ORAL at 12:26

## 2017-08-23 RX ADMIN — DOCUSATE SODIUM SCH MG: 100 CAPSULE, LIQUID FILLED ORAL at 21:43

## 2017-08-23 RX ADMIN — GABAPENTIN SCH MG: 100 CAPSULE ORAL at 21:43

## 2017-08-23 RX ADMIN — GABAPENTIN SCH MG: 100 CAPSULE ORAL at 13:05

## 2017-08-23 RX ADMIN — PANTOPRAZOLE SODIUM SCH MG: 40 TABLET, DELAYED RELEASE ORAL at 09:14

## 2017-08-23 RX ADMIN — Medication SCH TAB: at 09:14

## 2017-08-23 RX ADMIN — GABAPENTIN SCH MG: 100 CAPSULE ORAL at 07:23

## 2017-08-23 RX ADMIN — IBUPROFEN PRN MG: 600 TABLET, FILM COATED ORAL at 23:20

## 2017-08-23 RX ADMIN — FERROUS SULFATE TAB EC 324 MG (65 MG FE EQUIVALENT) SCH MG: 324 (65 FE) TABLET DELAYED RESPONSE at 09:14

## 2017-08-23 RX ADMIN — AMOXICILLIN AND CLAVULANATE POTASSIUM SCH TAB: 875; 125 TABLET, FILM COATED ORAL at 21:43

## 2017-08-23 RX ADMIN — Medication SCH MG: at 21:43

## 2017-08-23 RX ADMIN — AMOXICILLIN AND CLAVULANATE POTASSIUM SCH TAB: 875; 125 TABLET, FILM COATED ORAL at 09:14

## 2017-08-23 RX ADMIN — NICOTINE POLACRILEX PRN MG: 2 GUM, CHEWING ORAL at 13:59
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
## 2017-08-23 NOTE — EKG
Test Reason : 

Blood Pressure : ***/*** mmHG

Vent. Rate : 058 BPM     Atrial Rate : 058 BPM

   P-R Int : 156 ms          QRS Dur : 090 ms

    QT Int : 414 ms       P-R-T Axes : 058 044 039 degrees

   QTc Int : 406 ms

 

SINUS BRADYCARDIA WITH BLOCKED PREMATURE ATRIAL COMPLEXES

POSSIBLE LEFT ATRIAL ENLARGEMENT

BORDERLINE ECG

WHEN COMPARED WITH ECG OF 08-AUG-2017 18:05,

PREMATURE ATRIAL COMPLEXES ARE NOW PRESENT

Confirmed by KRISTIE MONROY, ELLI ([id number]) on 8/23/2017 11:35:17 AM

 

Referred By: Sil Collado           Confirmed By:ELLI FLOWERS MD

## 2017-08-24 LAB
HIV 1 & 2 AB: NEGATIVE
HIV 1 AGP24: NEGATIVE

## 2017-08-24 RX ADMIN — NAPHAZOLINE HYDROCHLORIDE AND PHENIRAMINE MALEATE SCH DROP: .25; 3 SOLUTION/ DROPS OPHTHALMIC at 21:35

## 2017-08-24 RX ADMIN — AMOXICILLIN AND CLAVULANATE POTASSIUM SCH TAB: 875; 125 TABLET, FILM COATED ORAL at 21:33

## 2017-08-24 RX ADMIN — IBUPROFEN PRN MG: 600 TABLET, FILM COATED ORAL at 13:24

## 2017-08-24 RX ADMIN — FERROUS SULFATE TAB EC 324 MG (65 MG FE EQUIVALENT) SCH MG: 324 (65 FE) TABLET DELAYED RESPONSE at 10:11

## 2017-08-24 RX ADMIN — ESCITALOPRAM SCH MG: 20 TABLET, FILM COATED ORAL at 10:12

## 2017-08-24 RX ADMIN — DOCUSATE SODIUM SCH MG: 100 CAPSULE, LIQUID FILLED ORAL at 10:11

## 2017-08-24 RX ADMIN — GABAPENTIN SCH MG: 100 CAPSULE ORAL at 13:02

## 2017-08-24 RX ADMIN — Medication SCH TAB: at 10:12

## 2017-08-24 RX ADMIN — GABAPENTIN SCH MG: 100 CAPSULE ORAL at 21:33

## 2017-08-24 RX ADMIN — NICOTINE POLACRILEX PRN MG: 2 GUM, CHEWING ORAL at 06:45

## 2017-08-24 RX ADMIN — AMOXICILLIN AND CLAVULANATE POTASSIUM SCH TAB: 875; 125 TABLET, FILM COATED ORAL at 10:11

## 2017-08-24 RX ADMIN — Medication SCH MG: at 21:32

## 2017-08-24 RX ADMIN — PANTOPRAZOLE SODIUM SCH MG: 40 TABLET, DELAYED RELEASE ORAL at 10:12

## 2017-08-24 RX ADMIN — GABAPENTIN SCH MG: 100 CAPSULE ORAL at 06:44

## 2017-08-24 RX ADMIN — ANALGESIC BALM SCH APPLIC: 1.74; 4.06 OINTMENT TOPICAL at 23:17

## 2017-08-24 RX ADMIN — IBUPROFEN PRN MG: 600 TABLET, FILM COATED ORAL at 21:35

## 2017-08-24 RX ADMIN — DOCUSATE SODIUM SCH MG: 100 CAPSULE, LIQUID FILLED ORAL at 21:33

## 2017-08-24 RX ADMIN — CYCLOBENZAPRINE HYDROCHLORIDE PRN MG: 10 TABLET, FILM COATED ORAL at 15:12

## 2017-08-25 LAB
APPEARANCE UR: (no result)
BILIRUB UR STRIP.AUTO-MCNC: NEGATIVE MG/DL
COLOR UR: (no result)
KETONES UR QL STRIP: NEGATIVE
LEUKOCYTE ESTERASE UR QL STRIP.AUTO: NEGATIVE
MUCOUS THREADS URNS QL MICRO: (no result)
NITRITE UR QL STRIP: NEGATIVE
PH UR: 7 [PH] (ref 5–8)
PROT UR QL STRIP: NEGATIVE
PROT UR QL STRIP: NEGATIVE
RBC # BLD AUTO: 6 /HPF (ref 0–3)
RBC # UR STRIP: (no result) /UL
SP GR UR: 1.02 (ref 1–1.02)
UROBILINOGEN UR STRIP-MCNC: NEGATIVE MG/DL (ref 0.2–1)
WBC # UR AUTO: 1 /HPF (ref 3–5)

## 2017-08-25 RX ADMIN — FERROUS SULFATE TAB EC 324 MG (65 MG FE EQUIVALENT) SCH MG: 324 (65 FE) TABLET DELAYED RESPONSE at 10:18

## 2017-08-25 RX ADMIN — CYCLOBENZAPRINE HYDROCHLORIDE PRN MG: 10 TABLET, FILM COATED ORAL at 20:20

## 2017-08-25 RX ADMIN — NICOTINE POLACRILEX PRN MG: 2 GUM, CHEWING ORAL at 06:47

## 2017-08-25 RX ADMIN — ANALGESIC BALM SCH: 1.74; 4.06 OINTMENT TOPICAL at 10:19

## 2017-08-25 RX ADMIN — AMOXICILLIN AND CLAVULANATE POTASSIUM SCH TAB: 875; 125 TABLET, FILM COATED ORAL at 10:18

## 2017-08-25 RX ADMIN — Medication SCH TAB: at 10:18

## 2017-08-25 RX ADMIN — NAPHAZOLINE HYDROCHLORIDE AND PHENIRAMINE MALEATE SCH DROP: .25; 3 SOLUTION/ DROPS OPHTHALMIC at 10:18

## 2017-08-25 RX ADMIN — NAPHAZOLINE HYDROCHLORIDE AND PHENIRAMINE MALEATE SCH DROP: .25; 3 SOLUTION/ DROPS OPHTHALMIC at 21:45

## 2017-08-25 RX ADMIN — GABAPENTIN SCH MG: 100 CAPSULE ORAL at 06:45

## 2017-08-25 RX ADMIN — IBUPROFEN PRN MG: 600 TABLET, FILM COATED ORAL at 21:49

## 2017-08-25 RX ADMIN — PANTOPRAZOLE SODIUM SCH MG: 40 TABLET, DELAYED RELEASE ORAL at 10:18

## 2017-08-25 RX ADMIN — AMOXICILLIN AND CLAVULANATE POTASSIUM SCH TAB: 875; 125 TABLET, FILM COATED ORAL at 21:45

## 2017-08-25 RX ADMIN — ANALGESIC BALM PRN APPLIC: 1.74; 4.06 OINTMENT TOPICAL at 21:48

## 2017-08-25 RX ADMIN — Medication SCH MG: at 21:45

## 2017-08-25 RX ADMIN — DOCUSATE SODIUM SCH MG: 100 CAPSULE, LIQUID FILLED ORAL at 21:45

## 2017-08-25 RX ADMIN — DOCUSATE SODIUM SCH MG: 100 CAPSULE, LIQUID FILLED ORAL at 10:18

## 2017-08-25 RX ADMIN — ESCITALOPRAM SCH MG: 20 TABLET, FILM COATED ORAL at 10:19

## 2017-08-25 RX ADMIN — GABAPENTIN SCH MG: 100 CAPSULE ORAL at 13:22

## 2017-08-25 RX ADMIN — GABAPENTIN SCH MG: 100 CAPSULE ORAL at 21:45

## 2017-08-26 RX ADMIN — GABAPENTIN SCH MG: 100 CAPSULE ORAL at 13:07

## 2017-08-26 RX ADMIN — ESCITALOPRAM SCH MG: 20 TABLET, FILM COATED ORAL at 09:30

## 2017-08-26 RX ADMIN — Medication SCH TAB: at 09:30

## 2017-08-26 RX ADMIN — AMOXICILLIN AND CLAVULANATE POTASSIUM SCH TAB: 875; 125 TABLET, FILM COATED ORAL at 21:32

## 2017-08-26 RX ADMIN — DOCUSATE SODIUM SCH MG: 100 CAPSULE, LIQUID FILLED ORAL at 21:33

## 2017-08-26 RX ADMIN — CYCLOBENZAPRINE HYDROCHLORIDE PRN MG: 10 TABLET, FILM COATED ORAL at 21:35

## 2017-08-26 RX ADMIN — IBUPROFEN PRN MG: 600 TABLET, FILM COATED ORAL at 16:05

## 2017-08-26 RX ADMIN — AMOXICILLIN AND CLAVULANATE POTASSIUM SCH TAB: 875; 125 TABLET, FILM COATED ORAL at 09:28

## 2017-08-26 RX ADMIN — GABAPENTIN SCH MG: 100 CAPSULE ORAL at 21:33

## 2017-08-26 RX ADMIN — Medication SCH MG: at 21:33

## 2017-08-26 RX ADMIN — PANTOPRAZOLE SODIUM SCH MG: 40 TABLET, DELAYED RELEASE ORAL at 09:30

## 2017-08-26 RX ADMIN — GABAPENTIN SCH MG: 100 CAPSULE ORAL at 06:41

## 2017-08-26 RX ADMIN — NAPHAZOLINE HYDROCHLORIDE AND PHENIRAMINE MALEATE SCH DROP: .25; 3 SOLUTION/ DROPS OPHTHALMIC at 09:31

## 2017-08-26 RX ADMIN — FERROUS SULFATE TAB EC 324 MG (65 MG FE EQUIVALENT) SCH MG: 324 (65 FE) TABLET DELAYED RESPONSE at 09:28

## 2017-08-26 RX ADMIN — DOCUSATE SODIUM SCH MG: 100 CAPSULE, LIQUID FILLED ORAL at 09:28

## 2017-08-26 RX ADMIN — NAPHAZOLINE HYDROCHLORIDE AND PHENIRAMINE MALEATE SCH DROP: .25; 3 SOLUTION/ DROPS OPHTHALMIC at 21:33

## 2017-08-27 RX ADMIN — AMOXICILLIN AND CLAVULANATE POTASSIUM SCH TAB: 875; 125 TABLET, FILM COATED ORAL at 09:47

## 2017-08-27 RX ADMIN — DOCUSATE SODIUM SCH MG: 100 CAPSULE, LIQUID FILLED ORAL at 09:47

## 2017-08-27 RX ADMIN — GABAPENTIN SCH MG: 100 CAPSULE ORAL at 06:41

## 2017-08-27 RX ADMIN — FERROUS SULFATE TAB EC 324 MG (65 MG FE EQUIVALENT) SCH MG: 324 (65 FE) TABLET DELAYED RESPONSE at 09:47

## 2017-08-27 RX ADMIN — NAPHAZOLINE HYDROCHLORIDE AND PHENIRAMINE MALEATE SCH DROP: .25; 3 SOLUTION/ DROPS OPHTHALMIC at 21:25

## 2017-08-27 RX ADMIN — NAPHAZOLINE HYDROCHLORIDE AND PHENIRAMINE MALEATE SCH DROP: .25; 3 SOLUTION/ DROPS OPHTHALMIC at 09:47

## 2017-08-27 RX ADMIN — PANTOPRAZOLE SODIUM SCH MG: 40 TABLET, DELAYED RELEASE ORAL at 09:48

## 2017-08-27 RX ADMIN — ESCITALOPRAM SCH MG: 20 TABLET, FILM COATED ORAL at 09:47

## 2017-08-27 RX ADMIN — CYCLOBENZAPRINE HYDROCHLORIDE PRN MG: 10 TABLET, FILM COATED ORAL at 21:26

## 2017-08-27 RX ADMIN — DOCUSATE SODIUM SCH MG: 100 CAPSULE, LIQUID FILLED ORAL at 21:24

## 2017-08-27 RX ADMIN — ANALGESIC BALM PRN APPLIC: 1.74; 4.06 OINTMENT TOPICAL at 21:28

## 2017-08-27 RX ADMIN — AMOXICILLIN AND CLAVULANATE POTASSIUM SCH TAB: 875; 125 TABLET, FILM COATED ORAL at 21:24

## 2017-08-27 RX ADMIN — GABAPENTIN SCH MG: 100 CAPSULE ORAL at 21:25

## 2017-08-27 RX ADMIN — Medication SCH TAB: at 09:47

## 2017-08-27 RX ADMIN — IBUPROFEN PRN MG: 600 TABLET, FILM COATED ORAL at 13:13

## 2017-08-27 RX ADMIN — Medication SCH MG: at 21:24

## 2017-08-27 RX ADMIN — GABAPENTIN SCH MG: 100 CAPSULE ORAL at 13:11

## 2017-08-28 RX ADMIN — GABAPENTIN SCH MG: 100 CAPSULE ORAL at 21:38

## 2017-08-28 RX ADMIN — ANALGESIC BALM PRN APPLIC: 1.74; 4.06 OINTMENT TOPICAL at 22:52

## 2017-08-28 RX ADMIN — AMOXICILLIN AND CLAVULANATE POTASSIUM SCH TAB: 875; 125 TABLET, FILM COATED ORAL at 21:38

## 2017-08-28 RX ADMIN — FERROUS SULFATE TAB EC 324 MG (65 MG FE EQUIVALENT) SCH MG: 324 (65 FE) TABLET DELAYED RESPONSE at 10:05

## 2017-08-28 RX ADMIN — GABAPENTIN SCH MG: 100 CAPSULE ORAL at 06:38

## 2017-08-28 RX ADMIN — PANTOPRAZOLE SODIUM SCH MG: 40 TABLET, DELAYED RELEASE ORAL at 10:05

## 2017-08-28 RX ADMIN — ESCITALOPRAM SCH MG: 20 TABLET, FILM COATED ORAL at 10:05

## 2017-08-28 RX ADMIN — Medication SCH MG: at 21:38

## 2017-08-28 RX ADMIN — VITAM B12 SCH MCG: 100 TAB at 15:21

## 2017-08-28 RX ADMIN — Medication SCH TAB: at 10:05

## 2017-08-28 RX ADMIN — NAPHAZOLINE HYDROCHLORIDE AND PHENIRAMINE MALEATE SCH DROP: .25; 3 SOLUTION/ DROPS OPHTHALMIC at 10:05

## 2017-08-28 RX ADMIN — IBUPROFEN PRN MG: 600 TABLET, FILM COATED ORAL at 10:08

## 2017-08-28 RX ADMIN — GABAPENTIN SCH MG: 100 CAPSULE ORAL at 13:52

## 2017-08-28 RX ADMIN — NAPHAZOLINE HYDROCHLORIDE AND PHENIRAMINE MALEATE SCH DROP: .25; 3 SOLUTION/ DROPS OPHTHALMIC at 21:38

## 2017-08-28 RX ADMIN — DOCUSATE SODIUM SCH MG: 100 CAPSULE, LIQUID FILLED ORAL at 10:04

## 2017-08-28 RX ADMIN — AMOXICILLIN AND CLAVULANATE POTASSIUM SCH TAB: 875; 125 TABLET, FILM COATED ORAL at 10:04

## 2017-08-28 RX ADMIN — CYCLOBENZAPRINE HYDROCHLORIDE PRN MG: 10 TABLET, FILM COATED ORAL at 21:39

## 2017-08-28 RX ADMIN — DOCUSATE SODIUM SCH MG: 100 CAPSULE, LIQUID FILLED ORAL at 21:38

## 2017-08-29 RX ADMIN — GABAPENTIN SCH MG: 100 CAPSULE ORAL at 13:26

## 2017-08-29 RX ADMIN — Medication SCH MG: at 21:54

## 2017-08-29 RX ADMIN — DOCUSATE SODIUM SCH MG: 100 CAPSULE, LIQUID FILLED ORAL at 10:40

## 2017-08-29 RX ADMIN — AMOXICILLIN AND CLAVULANATE POTASSIUM SCH TAB: 875; 125 TABLET, FILM COATED ORAL at 10:39

## 2017-08-29 RX ADMIN — NAPHAZOLINE HYDROCHLORIDE AND PHENIRAMINE MALEATE SCH DROP: .25; 3 SOLUTION/ DROPS OPHTHALMIC at 21:55

## 2017-08-29 RX ADMIN — PANTOPRAZOLE SODIUM SCH MG: 40 TABLET, DELAYED RELEASE ORAL at 10:39

## 2017-08-29 RX ADMIN — AMOXICILLIN AND CLAVULANATE POTASSIUM SCH TAB: 875; 125 TABLET, FILM COATED ORAL at 21:54

## 2017-08-29 RX ADMIN — CYCLOBENZAPRINE HYDROCHLORIDE PRN MG: 10 TABLET, FILM COATED ORAL at 21:56

## 2017-08-29 RX ADMIN — GABAPENTIN SCH MG: 100 CAPSULE ORAL at 21:54

## 2017-08-29 RX ADMIN — Medication SCH TAB: at 10:39

## 2017-08-29 RX ADMIN — FERROUS SULFATE TAB EC 324 MG (65 MG FE EQUIVALENT) SCH MG: 324 (65 FE) TABLET DELAYED RESPONSE at 10:39

## 2017-08-29 RX ADMIN — VITAM B12 SCH MCG: 100 TAB at 10:39

## 2017-08-29 RX ADMIN — DOCUSATE SODIUM SCH MG: 100 CAPSULE, LIQUID FILLED ORAL at 21:54

## 2017-08-29 RX ADMIN — ESCITALOPRAM SCH MG: 20 TABLET, FILM COATED ORAL at 10:39

## 2017-08-29 RX ADMIN — NAPHAZOLINE HYDROCHLORIDE AND PHENIRAMINE MALEATE SCH DROP: .25; 3 SOLUTION/ DROPS OPHTHALMIC at 10:40

## 2017-08-29 RX ADMIN — IBUPROFEN PRN MG: 600 TABLET, FILM COATED ORAL at 17:06

## 2017-08-29 RX ADMIN — GABAPENTIN SCH MG: 100 CAPSULE ORAL at 06:36

## 2017-08-30 RX ADMIN — CYCLOBENZAPRINE HYDROCHLORIDE PRN MG: 10 TABLET, FILM COATED ORAL at 21:47

## 2017-08-30 RX ADMIN — GABAPENTIN SCH MG: 100 CAPSULE ORAL at 13:01

## 2017-08-30 RX ADMIN — FERROUS SULFATE TAB EC 324 MG (65 MG FE EQUIVALENT) SCH MG: 324 (65 FE) TABLET DELAYED RESPONSE at 10:29

## 2017-08-30 RX ADMIN — GABAPENTIN SCH MG: 100 CAPSULE ORAL at 21:46

## 2017-08-30 RX ADMIN — NAPHAZOLINE HYDROCHLORIDE AND PHENIRAMINE MALEATE SCH DROP: .25; 3 SOLUTION/ DROPS OPHTHALMIC at 21:48

## 2017-08-30 RX ADMIN — ESCITALOPRAM SCH MG: 20 TABLET, FILM COATED ORAL at 10:29

## 2017-08-30 RX ADMIN — NAPHAZOLINE HYDROCHLORIDE AND PHENIRAMINE MALEATE SCH DROP: .25; 3 SOLUTION/ DROPS OPHTHALMIC at 10:30

## 2017-08-30 RX ADMIN — VITAM B12 SCH MCG: 100 TAB at 10:29

## 2017-08-30 RX ADMIN — DOCUSATE SODIUM SCH MG: 100 CAPSULE, LIQUID FILLED ORAL at 21:46

## 2017-08-30 RX ADMIN — DOCUSATE SODIUM SCH MG: 100 CAPSULE, LIQUID FILLED ORAL at 10:29

## 2017-08-30 RX ADMIN — IBUPROFEN PRN MG: 600 TABLET, FILM COATED ORAL at 13:02

## 2017-08-30 RX ADMIN — Medication SCH MG: at 21:46

## 2017-08-30 RX ADMIN — Medication SCH TAB: at 10:29

## 2017-08-30 RX ADMIN — PANTOPRAZOLE SODIUM SCH MG: 40 TABLET, DELAYED RELEASE ORAL at 10:28

## 2017-08-30 RX ADMIN — GABAPENTIN SCH MG: 100 CAPSULE ORAL at 06:33

## 2017-08-30 RX ADMIN — NICOTINE SCH MG: 14 PATCH, EXTENDED RELEASE TRANSDERMAL at 13:00

## 2017-08-31 RX ADMIN — NICOTINE SCH MG: 14 PATCH, EXTENDED RELEASE TRANSDERMAL at 10:06

## 2017-08-31 RX ADMIN — GABAPENTIN SCH MG: 100 CAPSULE ORAL at 06:14

## 2017-08-31 RX ADMIN — NAPHAZOLINE HYDROCHLORIDE AND PHENIRAMINE MALEATE SCH DROP: .25; 3 SOLUTION/ DROPS OPHTHALMIC at 10:05

## 2017-08-31 RX ADMIN — PANTOPRAZOLE SODIUM SCH MG: 40 TABLET, DELAYED RELEASE ORAL at 10:04

## 2017-08-31 RX ADMIN — DOCUSATE SODIUM SCH MG: 100 CAPSULE, LIQUID FILLED ORAL at 21:38

## 2017-08-31 RX ADMIN — DOCUSATE SODIUM SCH MG: 100 CAPSULE, LIQUID FILLED ORAL at 10:03

## 2017-08-31 RX ADMIN — Medication SCH MG: at 21:40

## 2017-08-31 RX ADMIN — NAPHAZOLINE HYDROCHLORIDE AND PHENIRAMINE MALEATE SCH: .25; 3 SOLUTION/ DROPS OPHTHALMIC at 21:40

## 2017-08-31 RX ADMIN — CYCLOBENZAPRINE HYDROCHLORIDE PRN MG: 10 TABLET, FILM COATED ORAL at 21:39

## 2017-08-31 RX ADMIN — ANALGESIC BALM PRN APPLIC: 1.74; 4.06 OINTMENT TOPICAL at 10:05

## 2017-08-31 RX ADMIN — GABAPENTIN SCH MG: 100 CAPSULE ORAL at 21:38

## 2017-08-31 RX ADMIN — IBUPROFEN PRN MG: 600 TABLET, FILM COATED ORAL at 18:38

## 2017-08-31 RX ADMIN — GABAPENTIN SCH MG: 100 CAPSULE ORAL at 13:11

## 2017-08-31 RX ADMIN — FERROUS SULFATE TAB EC 324 MG (65 MG FE EQUIVALENT) SCH MG: 324 (65 FE) TABLET DELAYED RESPONSE at 10:03

## 2017-08-31 RX ADMIN — VITAM B12 SCH MCG: 100 TAB at 10:03

## 2017-08-31 RX ADMIN — Medication SCH TAB: at 10:04

## 2017-08-31 RX ADMIN — ESCITALOPRAM SCH MG: 20 TABLET, FILM COATED ORAL at 10:04

## 2017-08-31 NOTE — PN
[facility] Progress Note


Note: 


received nurse reports that the patient fell in the dinning room, the floor was 

wet.


observed patient ambulate with cane at right hand, reports mild pain from right 

inner upper arm from fall, the right upper inner arm hit a chair


skin intact, mild tender and redness, shoulders and elbows full range of motion

, + pulses


patient reports that her left hand hit the ground when fall, left dorsal of 

hand mild swell, skin intact, wrist and fingers full range of motion, + pulses, 

ace bandage elevation, 


continue rehab and monitoring left hand and right upper arm

## 2017-09-01 RX ADMIN — ESCITALOPRAM SCH MG: 20 TABLET, FILM COATED ORAL at 10:43

## 2017-09-01 RX ADMIN — VITAM B12 SCH MCG: 100 TAB at 10:43

## 2017-09-01 RX ADMIN — GABAPENTIN SCH MG: 100 CAPSULE ORAL at 21:53

## 2017-09-01 RX ADMIN — PANTOPRAZOLE SODIUM SCH MG: 40 TABLET, DELAYED RELEASE ORAL at 10:43

## 2017-09-01 RX ADMIN — GABAPENTIN SCH MG: 100 CAPSULE ORAL at 13:07

## 2017-09-01 RX ADMIN — Medication SCH MG: at 21:53

## 2017-09-01 RX ADMIN — IBUPROFEN PRN MG: 600 TABLET, FILM COATED ORAL at 06:36

## 2017-09-01 RX ADMIN — GABAPENTIN SCH MG: 100 CAPSULE ORAL at 06:34

## 2017-09-01 RX ADMIN — NAPHAZOLINE HYDROCHLORIDE AND PHENIRAMINE MALEATE SCH DROP: .25; 3 SOLUTION/ DROPS OPHTHALMIC at 10:44

## 2017-09-01 RX ADMIN — CYCLOBENZAPRINE HYDROCHLORIDE PRN MG: 10 TABLET, FILM COATED ORAL at 21:54

## 2017-09-01 RX ADMIN — DOCUSATE SODIUM SCH MG: 100 CAPSULE, LIQUID FILLED ORAL at 10:43

## 2017-09-01 RX ADMIN — Medication SCH TAB: at 10:43

## 2017-09-01 RX ADMIN — DOCUSATE SODIUM SCH MG: 100 CAPSULE, LIQUID FILLED ORAL at 21:53

## 2017-09-01 RX ADMIN — IBUPROFEN PRN MG: 600 TABLET, FILM COATED ORAL at 13:07

## 2017-09-01 RX ADMIN — NAPHAZOLINE HYDROCHLORIDE AND PHENIRAMINE MALEATE SCH DROP: .25; 3 SOLUTION/ DROPS OPHTHALMIC at 21:53

## 2017-09-01 RX ADMIN — FERROUS SULFATE TAB EC 324 MG (65 MG FE EQUIVALENT) SCH MG: 324 (65 FE) TABLET DELAYED RESPONSE at 10:43

## 2017-09-01 RX ADMIN — NICOTINE SCH MG: 14 PATCH, EXTENDED RELEASE TRANSDERMAL at 10:43

## 2017-09-02 RX ADMIN — NAPHAZOLINE HYDROCHLORIDE AND PHENIRAMINE MALEATE SCH DROP: .25; 3 SOLUTION/ DROPS OPHTHALMIC at 21:35

## 2017-09-02 RX ADMIN — DOCUSATE SODIUM SCH MG: 100 CAPSULE, LIQUID FILLED ORAL at 21:35

## 2017-09-02 RX ADMIN — CYCLOBENZAPRINE HYDROCHLORIDE PRN MG: 10 TABLET, FILM COATED ORAL at 21:37

## 2017-09-02 RX ADMIN — PANTOPRAZOLE SODIUM SCH MG: 40 TABLET, DELAYED RELEASE ORAL at 10:07

## 2017-09-02 RX ADMIN — Medication SCH TAB: at 10:07

## 2017-09-02 RX ADMIN — FERROUS SULFATE TAB EC 324 MG (65 MG FE EQUIVALENT) SCH MG: 324 (65 FE) TABLET DELAYED RESPONSE at 10:07

## 2017-09-02 RX ADMIN — Medication SCH MG: at 21:35

## 2017-09-02 RX ADMIN — NAPHAZOLINE HYDROCHLORIDE AND PHENIRAMINE MALEATE SCH DROP: .25; 3 SOLUTION/ DROPS OPHTHALMIC at 10:07

## 2017-09-02 RX ADMIN — VITAM B12 SCH MCG: 100 TAB at 10:07

## 2017-09-02 RX ADMIN — DOCUSATE SODIUM SCH MG: 100 CAPSULE, LIQUID FILLED ORAL at 10:07

## 2017-09-02 RX ADMIN — ESCITALOPRAM SCH MG: 20 TABLET, FILM COATED ORAL at 10:07

## 2017-09-02 RX ADMIN — GABAPENTIN SCH MG: 100 CAPSULE ORAL at 07:04

## 2017-09-02 RX ADMIN — NICOTINE SCH MG: 14 PATCH, EXTENDED RELEASE TRANSDERMAL at 10:07

## 2017-09-02 RX ADMIN — GABAPENTIN SCH MG: 100 CAPSULE ORAL at 21:35

## 2017-09-02 RX ADMIN — ANALGESIC BALM PRN APPLIC: 1.74; 4.06 OINTMENT TOPICAL at 21:39

## 2017-09-02 RX ADMIN — GABAPENTIN SCH MG: 100 CAPSULE ORAL at 13:02

## 2017-09-03 RX ADMIN — FERROUS SULFATE TAB EC 324 MG (65 MG FE EQUIVALENT) SCH MG: 324 (65 FE) TABLET DELAYED RESPONSE at 10:02

## 2017-09-03 RX ADMIN — DOCUSATE SODIUM SCH MG: 100 CAPSULE, LIQUID FILLED ORAL at 10:02

## 2017-09-03 RX ADMIN — CYCLOBENZAPRINE HYDROCHLORIDE PRN MG: 10 TABLET, FILM COATED ORAL at 22:07

## 2017-09-03 RX ADMIN — Medication SCH MG: at 21:25

## 2017-09-03 RX ADMIN — DOCUSATE SODIUM SCH MG: 100 CAPSULE, LIQUID FILLED ORAL at 21:25

## 2017-09-03 RX ADMIN — NAPHAZOLINE HYDROCHLORIDE AND PHENIRAMINE MALEATE SCH DROP: .25; 3 SOLUTION/ DROPS OPHTHALMIC at 21:26

## 2017-09-03 RX ADMIN — GABAPENTIN SCH MG: 100 CAPSULE ORAL at 13:03

## 2017-09-03 RX ADMIN — ESCITALOPRAM SCH MG: 20 TABLET, FILM COATED ORAL at 10:02

## 2017-09-03 RX ADMIN — GABAPENTIN SCH MG: 100 CAPSULE ORAL at 06:28

## 2017-09-03 RX ADMIN — NAPHAZOLINE HYDROCHLORIDE AND PHENIRAMINE MALEATE SCH DROP: .25; 3 SOLUTION/ DROPS OPHTHALMIC at 10:03

## 2017-09-03 RX ADMIN — VITAM B12 SCH MCG: 100 TAB at 10:02

## 2017-09-03 RX ADMIN — GABAPENTIN SCH MG: 100 CAPSULE ORAL at 21:25

## 2017-09-03 RX ADMIN — NICOTINE SCH MG: 14 PATCH, EXTENDED RELEASE TRANSDERMAL at 10:02

## 2017-09-03 RX ADMIN — Medication SCH TAB: at 10:02

## 2017-09-03 RX ADMIN — PANTOPRAZOLE SODIUM SCH MG: 40 TABLET, DELAYED RELEASE ORAL at 10:02

## 2017-09-04 RX ADMIN — NICOTINE SCH MG: 14 PATCH, EXTENDED RELEASE TRANSDERMAL at 09:58

## 2017-09-04 RX ADMIN — GABAPENTIN SCH MG: 100 CAPSULE ORAL at 06:16

## 2017-09-04 RX ADMIN — PANTOPRAZOLE SODIUM SCH MG: 40 TABLET, DELAYED RELEASE ORAL at 09:58

## 2017-09-04 RX ADMIN — DOCUSATE SODIUM SCH MG: 100 CAPSULE, LIQUID FILLED ORAL at 09:56

## 2017-09-04 RX ADMIN — NAPHAZOLINE HYDROCHLORIDE AND PHENIRAMINE MALEATE SCH DROP: .25; 3 SOLUTION/ DROPS OPHTHALMIC at 21:28

## 2017-09-04 RX ADMIN — NAPHAZOLINE HYDROCHLORIDE AND PHENIRAMINE MALEATE SCH DROP: .25; 3 SOLUTION/ DROPS OPHTHALMIC at 09:59

## 2017-09-04 RX ADMIN — GABAPENTIN SCH MG: 100 CAPSULE ORAL at 21:27

## 2017-09-04 RX ADMIN — VITAM B12 SCH MCG: 100 TAB at 09:58

## 2017-09-04 RX ADMIN — ESCITALOPRAM SCH MG: 20 TABLET, FILM COATED ORAL at 09:57

## 2017-09-04 RX ADMIN — Medication SCH TAB: at 09:58

## 2017-09-04 RX ADMIN — CYCLOBENZAPRINE HYDROCHLORIDE PRN MG: 10 TABLET, FILM COATED ORAL at 21:29

## 2017-09-04 RX ADMIN — FERROUS SULFATE TAB EC 324 MG (65 MG FE EQUIVALENT) SCH MG: 324 (65 FE) TABLET DELAYED RESPONSE at 09:57

## 2017-09-04 RX ADMIN — DOCUSATE SODIUM SCH MG: 100 CAPSULE, LIQUID FILLED ORAL at 21:28

## 2017-09-04 RX ADMIN — Medication SCH MG: at 21:28

## 2017-09-04 RX ADMIN — GABAPENTIN SCH MG: 100 CAPSULE ORAL at 13:04

## 2017-09-04 NOTE — PN
Psychiatric Progress Note


Vital Signs: 


 Vital Signs











 Period  Temp  Pulse  Resp  BP Sys/Simon  Pulse Ox


 


 Last 24 Hr  97.6 F  79  18-18  112/73  











Date of Session: 09/04/17


Chief Complaint:: Discharge visit


HPI: patient addressed Cannabis dependence comorbid with Substance induced mood 

dsiorder.


ROS: Significant for GERD,Low back pain.


Current Medications: 


Active Medications











Generic Name Dose Route Start Last Admin





  Trade Name Freq  PRN Reason Stop Dose Admin


 


Acetaminophen  650 mg 08/22/17 17:36  





  Tylenol -  PO   





  Q4H PRN   





  PAIN   


 


Acetaminophen  500 mg 08/22/17 18:43  





  Tylenol -  PO   





  DAILY PRN   





  PAIN   


 


Al Hydroxide/Mg Hydroxide  30 ml 08/22/17 17:36 08/28/17 21:41





  Mylanta Oral Suspension -  PO   30 ml





  Q6H PRN   Administration





  DYSPEPSIA   


 


Cyanocobalamin  100 mcg 08/28/17 13:00 09/04/17 09:58





  Vitamin B12 -  PO   100 mcg





  DAILY LORENA   Administration


 


Cyclobenzaprine HCl  10 mg 08/22/17 18:43 09/03/17 22:07





  Flexeril -  PO   10 mg





  DAILY PRN   Administration


 


Diphenhydramine HCl  100 mg 08/30/17 22:00 09/03/17 21:25





  Benadryl -  PO   100 mg





  HS LORENA   Administration


 


Docusate Sodium  100 mg 08/23/17 22:00 09/04/17 09:56





  Colace -  PO   100 mg





  BID LORENA   Administration


 


Escitalopram Oxalate  20 mg 08/23/17 11:30 09/04/17 09:57





  Lexapro -  PO   20 mg





  DAILY LORENA   Administration


 


Eucalyptus/Menthol/Phenol/Sorbitol  1 each 08/22/17 17:36  





  Cepastat Lozenge -  MM   





  Q4H PRN   





  SORE THROAT   


 


Ferrous Sulfate  325 mg 08/23/17 10:00 09/04/17 09:57





  Feosol -  PO   325 mg





  DAILY LORENA   Administration


 


Gabapentin  100 mg 08/23/17 14:00 09/04/17 13:04





  Neurontin -  PO   100 mg





  TID LORENA   Administration


 


Guaifenesin  10 ml 08/22/17 17:36  





  Robitussin Dm -  PO   





  Q6H PRN   





  COUGH   


 


Hydroxyzine Pamoate  50 mg 08/22/17 17:36  





  Vistaril -  PO   





  Q4H PRN   





  AGITATION   


 


Ibuprofen  600 mg 08/22/17 18:44 09/01/17 13:07





  Motrin -  PO   600 mg





  Q8H PRN   Administration





  PAIN   


 


Lamotrigine  25 mg 08/23/17 11:30 09/04/17 09:57





  Lamictal -  PO   25 mg





  BID LORENA   Administration


 


Loperamide HCl  4 mg 08/22/17 17:36  





  Imodium -  PO   





  Q6H PRN   





  DIARRHEA   


 


Magnesium Citrate  300 ml 08/22/17 17:36  





  Citroma -  PO   





  Q48H PRN   





  CONSTIPATION   


 


Magnesium Hydroxide  30 ml 08/22/17 17:36  





  Milk Of Magnesia -  PO   





  DAILY PRN   





  CONSTIPATION   


 


Methyl Salicylate  1 applic 08/25/17 18:50 09/02/17 21:39





  [place]-Portillo -  TP   1 applic





  BID PRN   Administration





  BACK PAIN   


 


Naphazoline HCl/Pheniramine Maleate  1 drop 08/24/17 22:00 09/04/17 09:59





  Visine-A -  OU   1 drop





  BID LORENA   Administration


 


Nicotine  14 mg 08/30/17 12:45 09/04/17 09:58





  Nicoderm Patch -  TD   14 mg





  DAILY LORENA   Administration


 


Nicotine Polacrilex  2 mg 08/22/17 17:36 08/25/17 06:47





  Nicorette Gum -  BC   2 mg





  Q2H PRN   Administration





  NICOTINE REPLACEMENT RX   


 


Nicotine Polacrilex  2 mg 08/30/17 12:05  





  Nicorette Gum -  BUC   





  Q2H PRN   





  NICOTINE REPLACEMENT RX   


 


Pantoprazole Sodium  40 mg 08/23/17 10:00 09/04/17 09:58





  Protonix -  PO   40 mg





  DAILY LORENA   Administration


 


Prenatal Multivit/Folic Acid/Iron  1 tab 08/23/17 10:00 09/04/17 09:58





  Prenatal Vitamins (Sjr) -  PO   1 tab





  DAILY LORENA   Administration


 


Pseudoephedrine/Triprolidine  1 combo 08/22/17 17:36  





  Actifed -  PO   





  TID PRN   





  NASAL CONGESTION   


 


Thiamine HCl  100 mg 08/22/17 22:00 09/03/17 21:25





  Vitamin B1 -  PO   100 mg





  HS LORENA   Administration











Current Side Effect: No


Lab tests ordered: No


Lab tests reviewed: Yes


Provider note:: patient will complete this program tomorrow 09/05/17.She has 

met her treatment goals and will continue to address her issues on outpatient 

basis at [facility].Patient reports finding that 

current medications.Lamictal 25 mg po bid,Lexapro 20 mg po daily and Neurontin 

100 mg po tid help to cope with anxiety,mood instability,depression.scripts for 

30 days supply of the above medications provided.  Patient identifies areas of 

difficulties and ways,supports,coping skills she can utilize to maintain 

recovery.Supportive therapy ,psychoeducations has been proiivided as well.  

Patient is stable for discharge tomorrow 09/05/17.


Total face to face time:: 30





Mental Status Exam





- Mental Status Exam


Alert and Oriented to: Time, Place, Person


Cognitive Function: Grossly Intact


Patient Appearance: Well Groomed


Affect: Mood Congruent


Patient Behavior: Cooperative


Speech Pattern: Clear


Voice Loudness: Normal


Thought Process: Goal Oriented


Thought Disorder: Not Present


Hallucinations: Denies


Suicidal Ideation: Denies


Homicidal Ideation: Denies


Insight/Judgement: Fair


Sleep: Fair


Appetite: Fair


Muscle strength/Tone: Normal


Gait/Station: Normal





Psychiatric Treatment Plan





- Problem List


(1) Cannabis dependence


Current Visit: Yes





(2) GERD (gastroesophageal reflux disease)


Current Visit: Yes   





(3) History of fracture of right ankle


Current Visit: Yes





(4) Lactose intolerance


Current Visit: Yes





(5) Low back pain


Current Visit: Yes   





(6) Bipolar II disorder


Current Visit: Yes





(7) Perirectal abscess


Current Visit: Yes

## 2017-09-05 VITALS — DIASTOLIC BLOOD PRESSURE: 76 MMHG | SYSTOLIC BLOOD PRESSURE: 117 MMHG | HEART RATE: 71 BPM | TEMPERATURE: 98 F

## 2017-09-05 RX ADMIN — NICOTINE SCH: 14 PATCH, EXTENDED RELEASE TRANSDERMAL at 09:02

## 2017-09-05 RX ADMIN — FERROUS SULFATE TAB EC 324 MG (65 MG FE EQUIVALENT) SCH MG: 324 (65 FE) TABLET DELAYED RESPONSE at 09:01

## 2017-09-05 RX ADMIN — NAPHAZOLINE HYDROCHLORIDE AND PHENIRAMINE MALEATE SCH DROP: .25; 3 SOLUTION/ DROPS OPHTHALMIC at 09:02

## 2017-09-05 RX ADMIN — GABAPENTIN SCH MG: 100 CAPSULE ORAL at 06:24

## 2017-09-05 RX ADMIN — VITAM B12 SCH MCG: 100 TAB at 09:01

## 2017-09-05 RX ADMIN — PANTOPRAZOLE SODIUM SCH MG: 40 TABLET, DELAYED RELEASE ORAL at 09:01

## 2017-09-05 RX ADMIN — Medication SCH TAB: at 09:01

## 2017-09-05 RX ADMIN — ESCITALOPRAM SCH MG: 20 TABLET, FILM COATED ORAL at 09:01

## 2017-09-05 RX ADMIN — DOCUSATE SODIUM SCH MG: 100 CAPSULE, LIQUID FILLED ORAL at 09:01

## 2020-02-04 ENCOUNTER — HOSPITAL ENCOUNTER (EMERGENCY)
Dept: HOSPITAL 74 - JERFT | Age: 45
Discharge: HOME | End: 2020-02-04
Payer: COMMERCIAL

## 2020-02-04 VITALS — BODY MASS INDEX: 35.2 KG/M2

## 2020-02-04 VITALS — HEART RATE: 68 BPM | TEMPERATURE: 98.2 F | SYSTOLIC BLOOD PRESSURE: 105 MMHG | DIASTOLIC BLOOD PRESSURE: 43 MMHG

## 2020-02-04 DIAGNOSIS — F41.9: ICD-10-CM

## 2020-02-04 DIAGNOSIS — K21.9: ICD-10-CM

## 2020-02-04 DIAGNOSIS — Z86.69: ICD-10-CM

## 2020-02-04 DIAGNOSIS — D50.8: ICD-10-CM

## 2020-02-04 DIAGNOSIS — F32.9: ICD-10-CM

## 2020-02-04 DIAGNOSIS — F17.210: ICD-10-CM

## 2020-02-04 DIAGNOSIS — J20.9: Primary | ICD-10-CM

## 2020-02-04 NOTE — PDOC
History of Present Illness





- General


Chief Complaint: Cold Symptoms


Stated Complaint: COUGHING BLOOD


Time Seen by Provider: 02/04/20 15:31


History Source: Patient


Exam Limitations: No Limitations





Past History





- Travel


Traveled outside of the country in the last 30 days: No


Close contact w/someone who was outside of country & ill: No





- Past Medical History


Allergies/Adverse Reactions: 


                                    Allergies











Allergy/AdvReac Type Severity Reaction Status Date / Time


 


latex Allergy  Itching Verified 02/04/20 15:31


 


No Known Drug Allergies Allergy   Verified 02/04/20 15:31


 


lactose AdvReac Severe bloating Verified 02/04/20 15:31


 


NKDA Allergy   Uncoded 02/04/20 15:31











Home Medications: 


Ambulatory Orders





Cyclobenzaprine HCl [Flexeril -] 10 mg PO DAILY PRN 08/08/17 


Meloxicam [Mobic] 10 mg PO DAILY 08/08/17 


Ibuprofen [Motrin -] 600 mg PO TID PRN #90 tablet 08/09/17 


Acetaminophen [Tylenol .Extra-Strength -] 500 mg PO DAILY PRN 08/22/17 


Cyanocobalamin [Vitamin B12 -] 100 mcg PO DAILY #30 tablet MDD 1 09/04/17 


Diphenhydramine [Benadryl Capsule -] 100 mg PO HS #60 cap 09/04/17 


Pantoprazole Sodium [Protonix -] 40 mg PO DAILY #30 tablet.ec 09/04/17 


Albuterol Sulfate Inhaler - [Ventolin HFA Inhaler -] 1 - 2 inh PO Q4H #1 inhaler

02/04/20 


Azithromycin [Zithromax 250mg Tablets -] 250 mg PO DAILY #4 tab 02/04/20 


Benzonatate [Tessalon Pearls -] 100 mg PO TID #21 capsule 02/04/20 


predniSONE [Deltasone -] 40 mg PO DAILY #8 tablet 02/04/20 








Anemia: Yes (on iron )


Asthma: No


Cancer: No


Cardiac Disorders: No


CVA: No


COPD: No


CHF: No


Dementia: No


Diabetes: No


GI Disorders: Yes (gerd)


 Disorders: No


HTN: No


Hypercholesterolemia: No


Liver Disease: No


Psychiatric Problems: Yes (DEPRESSION,ANXIETY)


Seizures: Yes (febrile convulsion last age of 5 years)


Thyroid Disease: No





- Surgical History


Orthopedic Surgery: Yes (right ankle 2009)





- Psycho Social/Smoking Cessation Hx


Smoking History: Current every day smoker


Have you smoked in the past 12 months: Yes


Number of Cigarettes Smoked Daily: 6


Information on smoking cessation initiated: Yes


'Breaking Loose' booklet given: 08/22/17


Hx Alcohol Use: No


Drug/Substance Use Hx: Yes


Substance Use Type: Marijuana


Hx Substance Use Treatment: Yes (last treatment 2011 [facility])





**Review of Systems





- Review of Systems


Able to Perform ROS?: Yes


Comments:: 





02/04/20 17:54


CONSTITUTIONAL: 


Absent: fever, chills, diaphoresis, generalized weakness, malaise, loss of 

appetite


HEENT: 


Absent: rhinorrhea, nasal congestion, throat pain, throat swelling, difficulty 

swallowing, mouth swelling, ear pain, eye pain, visual Changes


CARDIOVASCULAR: 


Absent: chest pain, loss of consciousness, palpitations, irregular heart rate, 

peripheral edema


RESPIRATORY: 


Present: Cough Absent: shortness of breath, dyspnea with exertion, orthopnea, 

wheezing, stridor, hemoptysis


GASTROINTESTINAL:


Absent: abdominal pain, abdominal distension, nausea, vomiting, diarrhea, 

constipation, melena, hematochezia


GENITOURINARY: 


Absent: dysuria, frequency, urgency, hesitancy, hematuria, flank pain, genital 

pain


MUSCULOSKELETAL: 


Absent: myalgia, arthralgia, joint swelling


SKIN: 


Absent: rash, itching, pallor


HEMATOLOGIC/IMMUNOLOGIC: 


Absent: easy bleeding, easy bruising, lymphadenopathy, frequent infections


ENDOCRINE:


Absent: unexplained weight gain, unexplained weight loss, heat intolerance, cold

intolerance


NEUROLOGIC: 


Absent: headache, focal weakness or paresthesias, dizziness, unsteady gait, 

seizure, mental status changes, bladder or bowel incontinence


PSYCHIATRIC: 


Absent: anxiety, depression, suicidal or homicidal ideation, hallucinations.


Is the patient limited English proficient: No





*Physical Exam





- Vital Signs


                                Last Vital Signs











Temp Pulse Resp BP Pulse Ox


 


 98.2 F   68   18   105/43 L  99 


 


 02/04/20 15:33  02/04/20 15:33  02/04/20 15:33  02/04/20 15:33  02/04/20 15:33














- Physical Exam





02/04/20 17:54


GENERAL:


Well developed, well nourished. Awake and alert. No acute distress.


HEENT:


Normocephalic, atraumatic. PERRLA, EOMI. No conjunctival pallor. Sclera are non-

icteric. Moist mucous membranes. Oropharynx is clear.


NECK: 


Supple. Full ROM. No lymphadenopathy.


CARDIOVASCULAR:


Regular rate and rhythm. No murmurs, rubs, or gallops. Distal pulses are 2+ and 

symmetric. 


PULMONARY: 


No evidence of respiratory distress. Lungs clear to auscultation bilaterally. No

wheezing, rales or rhonchi.


SKIN: 


Warm and dry. Normal capillary refill. No rashes. No jaundice. 


NEUROLOGICAL: 


Alert, awake, appropriate. Cranial nerves 2-12 intact. No deficits to light 

touch and temperature in face, upper extremities and lower extremities. No motor

deficits in the in face, upper extremities and lower extremities. Normoreflexic 

in the upper and lower extremities. Normal speech. Toes are down-going 

bilaterally. Gait is normal without ataxia.


PSYCHIATRIC: 


Cooperative. Good eye contact. Appropriate mood and affect.





ED Treatment Course





- ADDITIONAL ORDERS


Additional order review: 


                               Laboratory  Results











  02/04/20





  16:04


 


Urine HCG, Qual  Negative














Medical Decision Making





- Medical Decision Making





02/04/20 17:54


Patient is a 44-year-old female past medical history of bronchitis, asthma, 

presents to the ER for 10 days of cough.  She states that at the beginning she 

had flulike symptoms however she did not have a fever.  She states that the 

aches subsided and the cough continued.  She states that the last few days she 

has been coughing up blood so she came to the ER for evaluation.  The patient is

an active smoker.





A/P: Bronchitis


On exam lungs are clear to auscultation bilateral without wheezes rales or 

rhonchi.  However, patient cannot take a deep breath without coughing.


Chest x-ray shows no pneumonia.


Blood likely a eduard brody tear.


Given length of symptoms, cough and shortness of breath we will treat as an 

acute bronchitis exacerbation


Azithromycin sent to patient's pharmacy as she is an active smoker.


Discharge home with primary care follow-up.


I discussed the physical exam findings, ancillary test results and final di

agnoses with the patient. I answered all of the patient's questions. The patient

was satisfied with the care received and felt comfortable with the discharge 

plan and treatment plan.  The Patient agrees to follow up with the primary care 

physician/specialist within 24-72 hours. Return precautions were given.








Discharge





- Discharge Information


Problems reviewed: Yes


Clinical Impression/Diagnosis: 


 Bronchitis





Condition: Stable


Disposition: HOME





- Admission


No





- Follow up/Referral


Referrals: 


Clara Sosa [Primary Care Provider] - 





- Patient Discharge Instructions


Patient Printed Discharge Instructions:  [facility] for Acute Bronchitis


Additional Instructions: 


You have bronchitis.  Your chest x-ray was negative for pneumonia.


Please use the inhaler every 4 hours for the next week to help with your cough.


Take the azithromycin as directed starting tomorrow night.


Continue taking the prednisone daily for the next 4 days starting tomorrow.


Take the Tessalon Perles as directed.


Please follow up with your primary care doctor in 1 week if your symptoms are 

not improving.





Return to the emergency department if you have fevers, chills, worsening cough, 

chest pain, worsening shortness of breath or if you have any changes in your 

symptoms.





- Post Discharge Activity


Work/Back to School Note:  Back to Work

## 2020-02-04 NOTE — PDOC
Rapid Medical Evaluation


Time Seen by Provider: 02/04/20 15:31


Medical Evaluation: 


 Allergies











Allergy/AdvReac Type Severity Reaction Status Date / Time


 


latex Allergy  Itching Verified 02/04/20 15:31


 


No Known Drug Allergies Allergy   Verified 02/04/20 15:31


 


lactose AdvReac Severe bloating Verified 02/04/20 15:31


 


NKDA Allergy   Uncoded 02/04/20 15:31











02/04/20 15:31


Pt c/o: cough x 2 weeks, + smoker


Pt on brief exam: vss


Pt ordered for: chest xray, urine preg


pt to proceed to the ED


02/04/20 15:33








**Discharge Disposition





- Diagnosis


 Bronchitis








- Discharge Dispostion


Disposition: HOME


Condition at time of disposition: Stable





- Prescriptions


Prescriptions: 


Albuterol Sulfate Inhaler - [Ventolin HFA Inhaler -] 1 - 2 inh PO Q4H #1 inhaler


Azithromycin [Zithromax 250mg Tablets -] 250 mg PO DAILY #4 tab


Benzonatate [Tessalon Pearls -] 100 mg PO TID #21 capsule


predniSONE [Deltasone -] 40 mg PO DAILY #8 tablet





- Referrals


Referrals: 


Clara Sosa [Primary Care Provider] - 





- Patient Instructions


Printed Discharge Instructions:  [facility] for Acute Bronchitis


Additional Instructions: 


You have bronchitis.  Your chest x-ray was negative for pneumonia.


Please use the inhaler every 4 hours for the next week to help with your cough.


Take the azithromycin as directed starting tomorrow night.


Continue taking the prednisone daily for the next 4 days starting tomorrow.


Take the Tessalon Perles as directed.


Please follow up with your primary care doctor in 1 week if your symptoms are 

not improving.





Return to the emergency department if you have fevers, chills, worsening cough, 

chest pain, worsening shortness of breath or if you have any changes in your 

symptoms.





- Post Discharge Activity


Work/School Note:  Back to Work